# Patient Record
Sex: MALE | Race: WHITE | Employment: FULL TIME | URBAN - METROPOLITAN AREA
[De-identification: names, ages, dates, MRNs, and addresses within clinical notes are randomized per-mention and may not be internally consistent; named-entity substitution may affect disease eponyms.]

---

## 2017-03-21 ENCOUNTER — TELEPHONE (OUTPATIENT)
Dept: INTERNAL MEDICINE CLINIC | Facility: CLINIC | Age: 37
End: 2017-03-21

## 2017-03-21 NOTE — TELEPHONE ENCOUNTER
Patient is having severe, left-sided abdominal pain under his ribs, radiating towards and the lower part of abdomen. This has been occurring for a month now, but has been gradually getting worse and is severe now. Please triage.

## 2017-03-21 NOTE — TELEPHONE ENCOUNTER
Spoke to pt. States he woke up 1 month ago with pain on left side and under ribs. Pt has not gone away. Rates as 3/10. Describes as an achy pain. Can feel when he applies pressure. Pain has radiating to lower abdomen and back.   OV scheduled for 3/22/

## 2017-03-22 ENCOUNTER — OFFICE VISIT (OUTPATIENT)
Dept: INTERNAL MEDICINE CLINIC | Facility: CLINIC | Age: 37
End: 2017-03-22

## 2017-03-22 VITALS
DIASTOLIC BLOOD PRESSURE: 74 MMHG | OXYGEN SATURATION: 99 % | SYSTOLIC BLOOD PRESSURE: 126 MMHG | HEIGHT: 67 IN | TEMPERATURE: 99 F | BODY MASS INDEX: 31.08 KG/M2 | HEART RATE: 73 BPM | RESPIRATION RATE: 16 BRPM | WEIGHT: 198 LBS

## 2017-03-22 DIAGNOSIS — R10.9 ABDOMINAL PAIN, UNSPECIFIED LOCATION: Primary | ICD-10-CM

## 2017-03-22 DIAGNOSIS — R07.89 CHEST WALL TENDERNESS: ICD-10-CM

## 2017-03-22 DIAGNOSIS — Z72.89 ALCOHOL USE: ICD-10-CM

## 2017-03-22 DIAGNOSIS — Z00.00 LABORATORY EXAM ORDERED AS PART OF ROUTINE GENERAL MEDICAL EXAMINATION: ICD-10-CM

## 2017-03-22 PROCEDURE — 99214 OFFICE O/P EST MOD 30 MIN: CPT | Performed by: PHYSICIAN ASSISTANT

## 2017-03-22 RX ORDER — NAPROXEN 500 MG/1
500 TABLET ORAL 2 TIMES DAILY
Qty: 28 TABLET | Refills: 0 | Status: SHIPPED | OUTPATIENT
Start: 2017-03-22 | End: 2017-04-05

## 2017-03-22 NOTE — PROGRESS NOTES
Elan Sykes is a 43year old male  Patient presents with:  Pain: Left side pain      HPI:   Pt here today with concerns regarding pain over left lower ribs and wraps around to back x 1 month  - states pain is present all the time  - worse with sitting Wt 198 lb  BMI 31.00 kg/m2  SpO2 99%  GENERAL: well developed, well nourished,in no apparent distress  SKIN: no rashes,no suspicious lesions  HEENT: atraumatic, normocephalic,ears and throat are clear, no pallor or icterus  NECK: supple,no adenopathy, no b

## 2018-01-01 ENCOUNTER — APPOINTMENT (OUTPATIENT)
Dept: GENERAL RADIOLOGY | Facility: HOSPITAL | Age: 38
DRG: 003 | End: 2018-01-01
Attending: INTERNAL MEDICINE
Payer: COMMERCIAL

## 2018-01-01 ENCOUNTER — APPOINTMENT (OUTPATIENT)
Dept: ULTRASOUND IMAGING | Facility: HOSPITAL | Age: 38
DRG: 003 | End: 2018-01-01
Attending: SURGERY
Payer: COMMERCIAL

## 2018-01-01 ENCOUNTER — APPOINTMENT (OUTPATIENT)
Dept: CV DIAGNOSTICS | Facility: HOSPITAL | Age: 38
DRG: 003 | End: 2018-01-01
Attending: INTERNAL MEDICINE
Payer: COMMERCIAL

## 2018-01-01 ENCOUNTER — ANESTHESIA (OUTPATIENT)
Dept: INTERVENTIONAL RADIOLOGY/VASCULAR | Facility: HOSPITAL | Age: 38
End: 2018-01-01

## 2018-01-01 ENCOUNTER — APPOINTMENT (OUTPATIENT)
Dept: INTERVENTIONAL RADIOLOGY/VASCULAR | Facility: HOSPITAL | Age: 38
DRG: 003 | End: 2018-01-01
Attending: INTERNAL MEDICINE
Payer: COMMERCIAL

## 2018-01-01 ENCOUNTER — ANESTHESIA EVENT (OUTPATIENT)
Dept: CARDIAC SURGERY | Facility: HOSPITAL | Age: 38
DRG: 003 | End: 2018-01-01
Payer: COMMERCIAL

## 2018-01-01 ENCOUNTER — ANESTHESIA (OUTPATIENT)
Dept: CARDIAC SURGERY | Facility: HOSPITAL | Age: 38
DRG: 003 | End: 2018-01-01
Payer: COMMERCIAL

## 2018-01-01 ENCOUNTER — HOSPITAL ENCOUNTER (INPATIENT)
Facility: HOSPITAL | Age: 38
LOS: 3 days | DRG: 003 | End: 2018-01-01
Attending: EMERGENCY MEDICINE | Admitting: HOSPITALIST
Payer: COMMERCIAL

## 2018-01-01 ENCOUNTER — ANESTHESIA EVENT (OUTPATIENT)
Dept: INTERVENTIONAL RADIOLOGY/VASCULAR | Facility: HOSPITAL | Age: 38
End: 2018-01-01

## 2018-01-01 VITALS
DIASTOLIC BLOOD PRESSURE: 64 MMHG | TEMPERATURE: 98 F | WEIGHT: 231.06 LBS | HEIGHT: 67 IN | OXYGEN SATURATION: 90 % | BODY MASS INDEX: 36.27 KG/M2 | SYSTOLIC BLOOD PRESSURE: 102 MMHG

## 2018-01-01 DIAGNOSIS — I21.3 ST ELEVATION MYOCARDIAL INFARCTION (STEMI), UNSPECIFIED ARTERY (HCC): ICD-10-CM

## 2018-01-01 DIAGNOSIS — I46.9 CARDIAC ARREST (HCC): Primary | ICD-10-CM

## 2018-01-01 LAB
ALBUMIN SERPL-MCNC: 2.5 G/DL (ref 3.5–4.8)
ALBUMIN SERPL-MCNC: 2.8 G/DL (ref 3.5–4.8)
ALBUMIN SERPL-MCNC: 3 G/DL (ref 3.5–4.8)
ALBUMIN SERPL-MCNC: 3 G/DL (ref 3.5–4.8)
ALBUMIN SERPL-MCNC: 3.9 G/DL (ref 3.5–4.8)
ALBUMIN/GLOB SERPL: 1.1 {RATIO} (ref 1–2)
ALBUMIN/GLOB SERPL: 1.3 {RATIO} (ref 1–2)
ALBUMIN/GLOB SERPL: 1.6 {RATIO} (ref 1–2)
ALLENS TEST: POSITIVE
ALP LIVER SERPL-CCNC: 135 U/L (ref 45–117)
ALP LIVER SERPL-CCNC: 41 U/L (ref 45–117)
ALP LIVER SERPL-CCNC: 56 U/L (ref 45–117)
ALP LIVER SERPL-CCNC: 61 U/L (ref 45–117)
ALP LIVER SERPL-CCNC: 92 U/L (ref 45–117)
ALT SERPL-CCNC: 298 U/L (ref 17–63)
ALT SERPL-CCNC: 715 U/L (ref 17–63)
ALT SERPL-CCNC: 73 U/L (ref 17–63)
ALT SERPL-CCNC: 743 U/L (ref 17–63)
ALT SERPL-CCNC: 792 U/L (ref 17–63)
AMPHET UR QL SCN: NEGATIVE
ANION GAP SERPL CALC-SCNC: 10 MMOL/L (ref 0–18)
ANION GAP SERPL CALC-SCNC: 13 MMOL/L (ref 0–18)
ANION GAP SERPL CALC-SCNC: 14 MMOL/L (ref 0–18)
ANION GAP SERPL CALC-SCNC: 15 MMOL/L (ref 0–18)
ANION GAP SERPL CALC-SCNC: 23 MMOL/L (ref 0–18)
ANTIBODY SCREEN: NEGATIVE
APTT PPP: 111.3 SECONDS (ref 26.1–34.6)
APTT PPP: 187.9 SECONDS (ref 26.1–34.6)
APTT PPP: 220 SECONDS (ref 26.1–34.6)
APTT PPP: 220.1 SECONDS (ref 26.1–34.6)
APTT PPP: 23.9 SECONDS (ref 26.1–34.6)
APTT PPP: 242.9 SECONDS (ref 26.1–34.6)
APTT PPP: 245.9 SECONDS (ref 26.1–34.6)
APTT PPP: 260.4 SECONDS (ref 26.1–34.6)
APTT PPP: 272.3 SECONDS (ref 26.1–34.6)
ARTERIAL BLD GAS O2 SATURATION: 51 % (ref 92–100)
ARTERIAL BLD GAS O2 SATURATION: 51 % (ref 92–100)
ARTERIAL BLD GAS O2 SATURATION: 55 % (ref 92–100)
ARTERIAL BLD GAS O2 SATURATION: 61 % (ref 92–100)
ARTERIAL BLD GAS O2 SATURATION: 62 % (ref 92–100)
ARTERIAL BLD GAS O2 SATURATION: 91 % (ref 92–100)
ARTERIAL BLD GAS O2 SATURATION: 96 % (ref 92–100)
ARTERIAL BLD GAS O2 SATURATION: 97 % (ref 92–100)
ARTERIAL BLD GAS O2 SATURATION: 98 % (ref 92–100)
ARTERIAL BLOOD GAS BASE EXCESS: -10.9
ARTERIAL BLOOD GAS BASE EXCESS: -15.1
ARTERIAL BLOOD GAS BASE EXCESS: -15.7
ARTERIAL BLOOD GAS BASE EXCESS: -16.4
ARTERIAL BLOOD GAS BASE EXCESS: -17.2
ARTERIAL BLOOD GAS BASE EXCESS: -18.2
ARTERIAL BLOOD GAS BASE EXCESS: -2.8
ARTERIAL BLOOD GAS BASE EXCESS: -4.4
ARTERIAL BLOOD GAS BASE EXCESS: -6.3
ARTERIAL BLOOD GAS BASE EXCESS: -6.9
ARTERIAL BLOOD GAS BASE EXCESS: -7.1
ARTERIAL BLOOD GAS BASE EXCESS: -8.2
ARTERIAL BLOOD GAS BASE EXCESS: -9.1
ARTERIAL BLOOD GAS HCO3: 12.7 MEQ/L (ref 22–26)
ARTERIAL BLOOD GAS HCO3: 13.3 MEQ/L (ref 22–26)
ARTERIAL BLOOD GAS HCO3: 14.2 MEQ/L (ref 22–26)
ARTERIAL BLOOD GAS HCO3: 15.3 MEQ/L (ref 22–26)
ARTERIAL BLOOD GAS HCO3: 15.6 MEQ/L (ref 22–26)
ARTERIAL BLOOD GAS HCO3: 18.5 MEQ/L (ref 22–26)
ARTERIAL BLOOD GAS HCO3: 19.1 MEQ/L (ref 22–26)
ARTERIAL BLOOD GAS HCO3: 19.4 MEQ/L (ref 22–26)
ARTERIAL BLOOD GAS HCO3: 19.9 MEQ/L (ref 22–26)
ARTERIAL BLOOD GAS HCO3: 20.2 MEQ/L (ref 22–26)
ARTERIAL BLOOD GAS HCO3: 20.8 MEQ/L (ref 22–26)
ARTERIAL BLOOD GAS HCO3: 21 MEQ/L (ref 22–26)
ARTERIAL BLOOD GAS HCO3: 23.1 MEQ/L (ref 22–26)
ARTERIAL BLOOD GAS PCO2: 35 MM HG (ref 35–45)
ARTERIAL BLOOD GAS PCO2: 37 MM HG (ref 35–45)
ARTERIAL BLOOD GAS PCO2: 42 MM HG (ref 35–45)
ARTERIAL BLOOD GAS PCO2: 43 MM HG (ref 35–45)
ARTERIAL BLOOD GAS PCO2: 43 MM HG (ref 35–45)
ARTERIAL BLOOD GAS PCO2: 44 MM HG (ref 35–45)
ARTERIAL BLOOD GAS PCO2: 44 MM HG (ref 35–45)
ARTERIAL BLOOD GAS PCO2: 45 MM HG (ref 35–45)
ARTERIAL BLOOD GAS PCO2: 51 MM HG (ref 35–45)
ARTERIAL BLOOD GAS PCO2: 53 MM HG (ref 35–45)
ARTERIAL BLOOD GAS PCO2: 54 MM HG (ref 35–45)
ARTERIAL BLOOD GAS PCO2: 61 MM HG (ref 35–45)
ARTERIAL BLOOD GAS PCO2: 63 MM HG (ref 35–45)
ARTERIAL BLOOD GAS PH: 7.02 (ref 7.35–7.45)
ARTERIAL BLOOD GAS PH: 7.02 (ref 7.35–7.45)
ARTERIAL BLOOD GAS PH: 7.03 (ref 7.35–7.45)
ARTERIAL BLOOD GAS PH: 7.06 (ref 7.35–7.45)
ARTERIAL BLOOD GAS PH: 7.11 (ref 7.35–7.45)
ARTERIAL BLOOD GAS PH: 7.18 (ref 7.35–7.45)
ARTERIAL BLOOD GAS PH: 7.24 (ref 7.35–7.45)
ARTERIAL BLOOD GAS PH: 7.25 (ref 7.35–7.45)
ARTERIAL BLOOD GAS PH: 7.26 (ref 7.35–7.45)
ARTERIAL BLOOD GAS PH: 7.27 (ref 7.35–7.45)
ARTERIAL BLOOD GAS PH: 7.28 (ref 7.35–7.45)
ARTERIAL BLOOD GAS PH: 7.35 (ref 7.35–7.45)
ARTERIAL BLOOD GAS PH: 7.36 (ref 7.35–7.45)
ARTERIAL BLOOD GAS PO2: 140 MM HG (ref 80–105)
ARTERIAL BLOOD GAS PO2: 168 MM HG (ref 80–105)
ARTERIAL BLOOD GAS PO2: 37 MM HG (ref 80–105)
ARTERIAL BLOOD GAS PO2: 38 MM HG (ref 80–105)
ARTERIAL BLOOD GAS PO2: 389 MM HG (ref 80–105)
ARTERIAL BLOOD GAS PO2: 393 MM HG (ref 80–105)
ARTERIAL BLOOD GAS PO2: 40 MM HG (ref 80–105)
ARTERIAL BLOOD GAS PO2: 402 MM HG (ref 80–105)
ARTERIAL BLOOD GAS PO2: 402 MM HG (ref 80–105)
ARTERIAL BLOOD GAS PO2: 415 MM HG (ref 80–105)
ARTERIAL BLOOD GAS PO2: 42 MM HG (ref 80–105)
ARTERIAL BLOOD GAS PO2: 46 MM HG (ref 80–105)
ARTERIAL BLOOD GAS PO2: 67 MM HG (ref 80–105)
AST SERPL-CCNC: 1809 U/L (ref 15–41)
AST SERPL-CCNC: 2330 U/L (ref 15–41)
AST SERPL-CCNC: 2653 U/L (ref 15–41)
AST SERPL-CCNC: 55 U/L (ref 15–41)
AST SERPL-CCNC: 619 U/L (ref 15–41)
ATRIAL RATE: 129 BPM
ATRIAL RATE: 129 BPM
ATRIAL RATE: 86 BPM
BAND %: 19 %
BARBITURATES UR QL SCN: NEGATIVE
BASOPHIL % MANUAL: 0 %
BASOPHIL ABSOLUTE MANUAL: 0 X10(3) UL (ref 0–0.1)
BASOPHILS # BLD AUTO: 0.01 X10(3) UL (ref 0–0.1)
BASOPHILS # BLD AUTO: 0.01 X10(3) UL (ref 0–0.1)
BASOPHILS # BLD AUTO: 0.02 X10(3) UL (ref 0–0.1)
BASOPHILS # BLD AUTO: 0.02 X10(3) UL (ref 0–0.1)
BASOPHILS # BLD AUTO: 0.05 X10(3) UL (ref 0–0.1)
BASOPHILS # BLD AUTO: 0.08 X10(3) UL (ref 0–0.1)
BASOPHILS NFR BLD AUTO: 0.1 %
BASOPHILS NFR BLD AUTO: 0.3 %
BASOPHILS NFR BLD AUTO: 0.5 %
BENZODIAZ UR QL SCN: NEGATIVE
BILIRUB DIRECT SERPL-MCNC: 0.3 MG/DL (ref 0.1–0.5)
BILIRUB DIRECT SERPL-MCNC: <0.1 MG/DL (ref 0.1–0.5)
BILIRUB SERPL-MCNC: 0.7 MG/DL (ref 0.1–2)
BILIRUB SERPL-MCNC: 0.8 MG/DL (ref 0.1–2)
BILIRUB SERPL-MCNC: 1.1 MG/DL (ref 0.1–2)
BILIRUB SERPL-MCNC: 1.4 MG/DL (ref 0.1–2)
BILIRUB SERPL-MCNC: 1.6 MG/DL (ref 0.1–2)
BLOOD TYPE BARCODE: 5100
BLOOD TYPE BARCODE: 6200
BUN BLD-MCNC: 14 MG/DL (ref 8–20)
BUN BLD-MCNC: 18 MG/DL (ref 8–20)
BUN BLD-MCNC: 18 MG/DL (ref 8–20)
BUN BLD-MCNC: 19 MG/DL (ref 8–20)
BUN BLD-MCNC: 20 MG/DL (ref 8–20)
BUN BLD-MCNC: 21 MG/DL (ref 8–20)
BUN BLD-MCNC: 22 MG/DL (ref 8–20)
BUN BLD-MCNC: 23 MG/DL (ref 8–20)
BUN BLD-MCNC: 23 MG/DL (ref 8–20)
BUN BLD-MCNC: 24 MG/DL (ref 8–20)
BUN BLD-MCNC: 25 MG/DL (ref 8–20)
BUN/CREAT SERPL: 10.6 (ref 10–20)
BUN/CREAT SERPL: 11.2 (ref 10–20)
BUN/CREAT SERPL: 7.9 (ref 10–20)
BUN/CREAT SERPL: 8.3 (ref 10–20)
BUN/CREAT SERPL: 8.5 (ref 10–20)
CALCIUM BLD-MCNC: 5.6 MG/DL (ref 8.3–10.3)
CALCIUM BLD-MCNC: 5.9 MG/DL (ref 8.3–10.3)
CALCIUM BLD-MCNC: 6.3 MG/DL (ref 8.3–10.3)
CALCIUM BLD-MCNC: 6.4 MG/DL (ref 8.3–10.3)
CALCIUM BLD-MCNC: 6.4 MG/DL (ref 8.3–10.3)
CALCIUM BLD-MCNC: 6.5 MG/DL (ref 8.3–10.3)
CALCIUM BLD-MCNC: 6.7 MG/DL (ref 8.3–10.3)
CALCIUM BLD-MCNC: 6.7 MG/DL (ref 8.3–10.3)
CALCIUM BLD-MCNC: 6.8 MG/DL (ref 8.3–10.3)
CALCIUM BLD-MCNC: 7.4 MG/DL (ref 8.3–10.3)
CALCIUM BLD-MCNC: 8.4 MG/DL (ref 8.3–10.3)
CALCULATED O2 SATURATION: 100 % (ref 92–100)
CALCULATED O2 SATURATION: 47 % (ref 92–100)
CALCULATED O2 SATURATION: 47 % (ref 92–100)
CALCULATED O2 SATURATION: 50 % (ref 92–100)
CALCULATED O2 SATURATION: 56 % (ref 92–100)
CALCULATED O2 SATURATION: 57 % (ref 92–100)
CALCULATED O2 SATURATION: 93 % (ref 92–100)
CALCULATED O2 SATURATION: 98 % (ref 92–100)
CALCULATED O2 SATURATION: 99 % (ref 92–100)
CANNABINOIDS UR QL SCN: NEGATIVE
CARBOXYHEMOGLOBIN: 1 % SAT (ref 0–3)
CARBOXYHEMOGLOBIN: 1.2 % SAT (ref 0–3)
CARBOXYHEMOGLOBIN: 1.3 % SAT (ref 0–3)
CARBOXYHEMOGLOBIN: 1.4 % SAT (ref 0–3)
CARBOXYHEMOGLOBIN: 1.4 % SAT (ref 0–3)
CARBOXYHEMOGLOBIN: 1.5 % SAT (ref 0–3)
CARBOXYHEMOGLOBIN: 1.5 % SAT (ref 0–3)
CARBOXYHEMOGLOBIN: 1.6 % SAT (ref 0–3)
CARBOXYHEMOGLOBIN: 1.9 % SAT (ref 0–3)
CHLORIDE SERPL-SCNC: 103 MMOL/L (ref 101–111)
CHLORIDE SERPL-SCNC: 104 MMOL/L (ref 101–111)
CHLORIDE SERPL-SCNC: 107 MMOL/L (ref 101–111)
CHLORIDE SERPL-SCNC: 108 MMOL/L (ref 101–111)
CHLORIDE SERPL-SCNC: 109 MMOL/L (ref 101–111)
CHLORIDE SERPL-SCNC: 110 MMOL/L (ref 101–111)
CHLORIDE SERPL-SCNC: 111 MMOL/L (ref 101–111)
CHLORIDE SERPL-SCNC: 111 MMOL/L (ref 101–111)
CK SERPL-CCNC: 4848 IU/L (ref 39–308)
CK SERPL-CCNC: 7608 IU/L (ref 39–308)
CO2 SERPL-SCNC: 13 MMOL/L (ref 22–32)
CO2 SERPL-SCNC: 19 MMOL/L (ref 22–32)
CO2 SERPL-SCNC: 19 MMOL/L (ref 22–32)
CO2 SERPL-SCNC: 20 MMOL/L (ref 22–32)
CO2 SERPL-SCNC: 21 MMOL/L (ref 22–32)
CO2 SERPL-SCNC: 22 MMOL/L (ref 22–32)
CO2 SERPL-SCNC: 23 MMOL/L (ref 22–32)
CO2 SERPL-SCNC: 24 MMOL/L (ref 22–32)
CO2 SERPL-SCNC: 24 MMOL/L (ref 22–32)
COCAINE UR QL: NEGATIVE
CREAT BLD-MCNC: 1.25 MG/DL (ref 0.7–1.3)
CREAT BLD-MCNC: 1.8 MG/DL (ref 0.7–1.3)
CREAT BLD-MCNC: 2.14 MG/DL (ref 0.7–1.3)
CREAT BLD-MCNC: 2.15 MG/DL (ref 0.7–1.3)
CREAT BLD-MCNC: 2.25 MG/DL (ref 0.7–1.3)
CREAT BLD-MCNC: 2.34 MG/DL (ref 0.7–1.3)
CREAT BLD-MCNC: 2.34 MG/DL (ref 0.7–1.3)
CREAT BLD-MCNC: 2.39 MG/DL (ref 0.7–1.3)
CREAT BLD-MCNC: 2.41 MG/DL (ref 0.7–1.3)
CREAT BLD-MCNC: 2.56 MG/DL (ref 0.7–1.3)
CREAT BLD-MCNC: 2.59 MG/DL (ref 0.7–1.3)
CREAT BLD-MCNC: 2.6 MG/DL (ref 0.7–1.3)
CREAT BLD-MCNC: 2.6 MG/DL (ref 0.7–1.3)
CREAT BLD-MCNC: 2.69 MG/DL (ref 0.7–1.3)
CREAT BLD-MCNC: 2.77 MG/DL (ref 0.7–1.3)
CREAT BLD-MCNC: 2.78 MG/DL (ref 0.7–1.3)
CREAT BLD-MCNC: 2.78 MG/DL (ref 0.7–1.3)
CREAT BLD-MCNC: 2.88 MG/DL (ref 0.7–1.3)
D-DIMER: 0.28 UG/ML FEU (ref 0–0.49)
D-DIMER: 5.31 UG/ML FEU (ref 0–0.49)
EOSINOPHIL # BLD AUTO: 0.01 X10(3) UL (ref 0–0.3)
EOSINOPHIL # BLD AUTO: 0.01 X10(3) UL (ref 0–0.3)
EOSINOPHIL # BLD AUTO: 0.02 X10(3) UL (ref 0–0.3)
EOSINOPHIL # BLD AUTO: 0.04 X10(3) UL (ref 0–0.3)
EOSINOPHIL # BLD AUTO: 0.06 X10(3) UL (ref 0–0.3)
EOSINOPHIL # BLD AUTO: 0.14 X10(3) UL (ref 0–0.3)
EOSINOPHIL % MANUAL: 1 %
EOSINOPHIL ABSOLUTE MANUAL: 0.13 X10(3) UL (ref 0–0.3)
EOSINOPHIL NFR BLD AUTO: 0.1 %
EOSINOPHIL NFR BLD AUTO: 0.2 %
EOSINOPHIL NFR BLD AUTO: 0.5 %
EOSINOPHIL NFR BLD AUTO: 1.3 %
ERYTHROCYTE [DISTWIDTH] IN BLOOD BY AUTOMATED COUNT: 12 % (ref 11.5–16)
ERYTHROCYTE [DISTWIDTH] IN BLOOD BY AUTOMATED COUNT: 12.4 % (ref 11.5–16)
ERYTHROCYTE [DISTWIDTH] IN BLOOD BY AUTOMATED COUNT: 13 % (ref 11.5–16)
ERYTHROCYTE [DISTWIDTH] IN BLOOD BY AUTOMATED COUNT: 13.6 % (ref 11.5–16)
ERYTHROCYTE [DISTWIDTH] IN BLOOD BY AUTOMATED COUNT: 13.7 % (ref 11.5–16)
ERYTHROCYTE [DISTWIDTH] IN BLOOD BY AUTOMATED COUNT: 13.8 % (ref 11.5–16)
ERYTHROCYTE [DISTWIDTH] IN BLOOD BY AUTOMATED COUNT: 14.3 % (ref 11.5–16)
ERYTHROCYTE [DISTWIDTH] IN BLOOD BY AUTOMATED COUNT: 14.6 % (ref 11.5–16)
ERYTHROCYTE [DISTWIDTH] IN BLOOD BY AUTOMATED COUNT: 14.6 % (ref 11.5–16)
ERYTHROCYTE [DISTWIDTH] IN BLOOD BY AUTOMATED COUNT: 14.8 % (ref 11.5–16)
EST. AVERAGE GLUCOSE BLD GHB EST-MCNC: 114 MG/DL (ref 68–126)
ETHANOL UR-MCNC: POSITIVE MG/DL
ETHYL ALCOHOL: 30 MG/DL (ref ?–3)
FIBRINOGEN: 199 MG/DL (ref 200–446)
FIBRINOGEN: 425 MG/DL (ref 200–446)
FIO2: 100 %
FIO2: 40 %
FIO2: 40 %
FIO2: 60 %
FIO2: 60 %
FIO2: 80 %
GLOBULIN PLAS-MCNC: 1.6 G/DL (ref 2.5–4)
GLOBULIN PLAS-MCNC: 2.3 G/DL (ref 2.5–4)
GLOBULIN PLAS-MCNC: 3.7 G/DL (ref 2.5–4)
GLUCOSE BLD-MCNC: 100 MG/DL (ref 65–99)
GLUCOSE BLD-MCNC: 100 MG/DL (ref 65–99)
GLUCOSE BLD-MCNC: 100 MG/DL (ref 70–99)
GLUCOSE BLD-MCNC: 101 MG/DL (ref 65–99)
GLUCOSE BLD-MCNC: 101 MG/DL (ref 65–99)
GLUCOSE BLD-MCNC: 102 MG/DL (ref 65–99)
GLUCOSE BLD-MCNC: 102 MG/DL (ref 70–99)
GLUCOSE BLD-MCNC: 103 MG/DL (ref 70–99)
GLUCOSE BLD-MCNC: 104 MG/DL (ref 65–99)
GLUCOSE BLD-MCNC: 104 MG/DL (ref 65–99)
GLUCOSE BLD-MCNC: 106 MG/DL (ref 65–99)
GLUCOSE BLD-MCNC: 106 MG/DL (ref 70–99)
GLUCOSE BLD-MCNC: 108 MG/DL (ref 65–99)
GLUCOSE BLD-MCNC: 108 MG/DL (ref 65–99)
GLUCOSE BLD-MCNC: 109 MG/DL (ref 65–99)
GLUCOSE BLD-MCNC: 109 MG/DL (ref 70–99)
GLUCOSE BLD-MCNC: 110 MG/DL (ref 65–99)
GLUCOSE BLD-MCNC: 110 MG/DL (ref 70–99)
GLUCOSE BLD-MCNC: 110 MG/DL (ref 70–99)
GLUCOSE BLD-MCNC: 111 MG/DL (ref 65–99)
GLUCOSE BLD-MCNC: 112 MG/DL (ref 65–99)
GLUCOSE BLD-MCNC: 113 MG/DL (ref 65–99)
GLUCOSE BLD-MCNC: 113 MG/DL (ref 65–99)
GLUCOSE BLD-MCNC: 114 MG/DL (ref 65–99)
GLUCOSE BLD-MCNC: 114 MG/DL (ref 70–99)
GLUCOSE BLD-MCNC: 115 MG/DL (ref 65–99)
GLUCOSE BLD-MCNC: 116 MG/DL (ref 65–99)
GLUCOSE BLD-MCNC: 117 MG/DL (ref 65–99)
GLUCOSE BLD-MCNC: 118 MG/DL (ref 65–99)
GLUCOSE BLD-MCNC: 118 MG/DL (ref 65–99)
GLUCOSE BLD-MCNC: 118 MG/DL (ref 70–99)
GLUCOSE BLD-MCNC: 119 MG/DL (ref 65–99)
GLUCOSE BLD-MCNC: 120 MG/DL (ref 70–99)
GLUCOSE BLD-MCNC: 122 MG/DL (ref 65–99)
GLUCOSE BLD-MCNC: 123 MG/DL (ref 65–99)
GLUCOSE BLD-MCNC: 123 MG/DL (ref 65–99)
GLUCOSE BLD-MCNC: 123 MG/DL (ref 70–99)
GLUCOSE BLD-MCNC: 124 MG/DL (ref 65–99)
GLUCOSE BLD-MCNC: 124 MG/DL (ref 70–99)
GLUCOSE BLD-MCNC: 125 MG/DL (ref 65–99)
GLUCOSE BLD-MCNC: 126 MG/DL (ref 65–99)
GLUCOSE BLD-MCNC: 130 MG/DL (ref 70–99)
GLUCOSE BLD-MCNC: 137 MG/DL (ref 65–99)
GLUCOSE BLD-MCNC: 137 MG/DL (ref 70–99)
GLUCOSE BLD-MCNC: 145 MG/DL (ref 65–99)
GLUCOSE BLD-MCNC: 149 MG/DL (ref 70–99)
GLUCOSE BLD-MCNC: 149 MG/DL (ref 70–99)
GLUCOSE BLD-MCNC: 164 MG/DL (ref 65–99)
GLUCOSE BLD-MCNC: 167 MG/DL (ref 65–99)
GLUCOSE BLD-MCNC: 173 MG/DL (ref 65–99)
GLUCOSE BLD-MCNC: 177 MG/DL (ref 65–99)
GLUCOSE BLD-MCNC: 195 MG/DL (ref 70–99)
GLUCOSE BLD-MCNC: 227 MG/DL (ref 65–99)
GLUCOSE BLD-MCNC: 235 MG/DL (ref 65–99)
GLUCOSE BLD-MCNC: 243 MG/DL (ref 70–99)
GLUCOSE BLD-MCNC: 256 MG/DL (ref 65–99)
GLUCOSE BLD-MCNC: 322 MG/DL (ref 70–99)
GLUCOSE BLD-MCNC: 323 MG/DL (ref 65–99)
GLUCOSE BLD-MCNC: 338 MG/DL (ref 65–99)
GLUCOSE BLD-MCNC: 355 MG/DL (ref 70–99)
GLUCOSE BLD-MCNC: 379 MG/DL (ref 70–99)
GLUCOSE BLD-MCNC: 87 MG/DL (ref 65–99)
GLUCOSE BLD-MCNC: 88 MG/DL (ref 70–99)
GLUCOSE BLD-MCNC: 89 MG/DL (ref 65–99)
GLUCOSE BLD-MCNC: 92 MG/DL (ref 65–99)
GLUCOSE BLD-MCNC: 94 MG/DL (ref 65–99)
GLUCOSE BLD-MCNC: 99 MG/DL (ref 70–99)
HAV IGM SER QL: 1.7 MG/DL (ref 1.8–2.5)
HAV IGM SER QL: 1.9 MG/DL (ref 1.8–2.5)
HAV IGM SER QL: 2 MG/DL (ref 1.8–2.5)
HAV IGM SER QL: 2.1 MG/DL (ref 1.8–2.5)
HAV IGM SER QL: 2.5 MG/DL (ref 1.8–2.5)
HBA1C MFR BLD HPLC: 5.6 % (ref ?–5.7)
HBV SURFACE AG SER-ACNC: 0.14 [IU]/L
HBV SURFACE AG SERPL QL IA: NONREACTIVE
HCT VFR BLD AUTO: 19.1 % (ref 37–53)
HCT VFR BLD AUTO: 19.3 % (ref 37–53)
HCT VFR BLD AUTO: 19.4 % (ref 37–53)
HCT VFR BLD AUTO: 19.8 % (ref 37–53)
HCT VFR BLD AUTO: 20.7 % (ref 37–53)
HCT VFR BLD AUTO: 23.2 % (ref 37–53)
HCT VFR BLD AUTO: 26.1 % (ref 37–53)
HCT VFR BLD AUTO: 28.5 % (ref 37–53)
HCT VFR BLD AUTO: 46.3 % (ref 37–53)
HCT VFR BLD AUTO: 46.7 % (ref 37–53)
HEPARIN INDUCED PLT ANTIBODY: NEGATIVE
HGB BLD-MCNC: 10.9 G/DL (ref 13–17)
HGB BLD-MCNC: 15.7 G/DL (ref 13–17)
HGB BLD-MCNC: 15.8 G/DL (ref 13–17)
HGB BLD-MCNC: 6.6 G/DL (ref 13–17)
HGB BLD-MCNC: 6.9 G/DL (ref 13–17)
HGB BLD-MCNC: 7.1 G/DL (ref 13–17)
HGB BLD-MCNC: 7.5 G/DL (ref 13–17)
HGB BLD-MCNC: 8.2 G/DL (ref 13–17)
HGB BLD-MCNC: 9 G/DL (ref 13–17)
HGB BLD-MCNC: 9.9 G/DL (ref 13–17)
IMMATURE GRANULOCYTE COUNT: 0.03 X10(3) UL (ref 0–1)
IMMATURE GRANULOCYTE COUNT: 0.08 X10(3) UL (ref 0–1)
IMMATURE GRANULOCYTE COUNT: 0.13 X10(3) UL (ref 0–1)
IMMATURE GRANULOCYTE COUNT: 0.24 X10(3) UL (ref 0–1)
IMMATURE GRANULOCYTE COUNT: 0.26 X10(3) UL (ref 0–1)
IMMATURE GRANULOCYTE COUNT: 0.48 X10(3) UL (ref 0–1)
IMMATURE GRANULOCYTE RATIO %: 0.3 %
IMMATURE GRANULOCYTE RATIO %: 0.6 %
IMMATURE GRANULOCYTE RATIO %: 0.7 %
IMMATURE GRANULOCYTE RATIO %: 1.6 %
IMMATURE GRANULOCYTE RATIO %: 1.8 %
IMMATURE GRANULOCYTE RATIO %: 2.1 %
INR BLD: 0.96 (ref 0.9–1.1)
INR BLD: 1.29 (ref 0.9–1.1)
INR BLD: 1.36 (ref 0.9–1.1)
INR BLD: 1.72 (ref 0.9–1.1)
INR BLD: 2.01 (ref 0.9–1.1)
IONIZED CALCIUM: 0.99 MMOL/L (ref 1.12–1.32)
IONIZED CALCIUM: 0.99 MMOL/L (ref 1.12–1.32)
IONIZED CALCIUM: 1.02 MMOL/L (ref 1.12–1.32)
IONIZED CALCIUM: 1.02 MMOL/L (ref 1.12–1.32)
IONIZED CALCIUM: 1.06 MMOL/L (ref 1.12–1.32)
IONIZED CALCIUM: 1.06 MMOL/L (ref 1.12–1.32)
ISTAT ACTIVATED CLOTTING TIME: 125 SECONDS (ref 74–137)
ISTAT ACTIVATED CLOTTING TIME: 142 SECONDS (ref 74–137)
ISTAT ACTIVATED CLOTTING TIME: 153 SECONDS (ref 74–137)
ISTAT ACTIVATED CLOTTING TIME: 158 SECONDS (ref 74–137)
ISTAT ACTIVATED CLOTTING TIME: 164 SECONDS (ref 74–137)
ISTAT ACTIVATED CLOTTING TIME: 164 SECONDS (ref 74–137)
ISTAT ACTIVATED CLOTTING TIME: 175 SECONDS (ref 74–137)
ISTAT ACTIVATED CLOTTING TIME: 180 SECONDS (ref 74–137)
ISTAT ACTIVATED CLOTTING TIME: 180 SECONDS (ref 74–137)
ISTAT ACTIVATED CLOTTING TIME: 186 SECONDS (ref 74–137)
ISTAT ACTIVATED CLOTTING TIME: 191 SECONDS (ref 74–137)
ISTAT ACTIVATED CLOTTING TIME: 197 SECONDS (ref 74–137)
ISTAT ACTIVATED CLOTTING TIME: 202 SECONDS (ref 74–137)
ISTAT ACTIVATED CLOTTING TIME: 208 SECONDS (ref 74–137)
ISTAT ACTIVATED CLOTTING TIME: 213 SECONDS (ref 74–137)
ISTAT ACTIVATED CLOTTING TIME: 230 SECONDS (ref 74–137)
ISTAT ACTIVATED CLOTTING TIME: 235 SECONDS (ref 74–137)
ISTAT ACTIVATED CLOTTING TIME: 246 SECONDS (ref 74–137)
ISTAT ACTIVATED CLOTTING TIME: 285 SECONDS (ref 74–137)
ISTAT BLOOD GAS BASE DEFICIT: -1 MMOL/L
ISTAT BLOOD GAS BASE DEFICIT: -2 MMOL/L
ISTAT BLOOD GAS BASE DEFICIT: -3 MMOL/L
ISTAT BLOOD GAS BASE DEFICIT: -5 MMOL/L
ISTAT BLOOD GAS BASE DEFICIT: -5 MMOL/L
ISTAT BLOOD GAS BASE DEFICIT: -7 MMOL/L
ISTAT BLOOD GAS HCO3: 20.4 MEQ/L (ref 22–26)
ISTAT BLOOD GAS HCO3: 20.6 MEQ/L (ref 22–26)
ISTAT BLOOD GAS HCO3: 20.8 MEQ/L (ref 22–26)
ISTAT BLOOD GAS HCO3: 22.3 MEQ/L (ref 22–26)
ISTAT BLOOD GAS HCO3: 22.5 MEQ/L (ref 22–26)
ISTAT BLOOD GAS HCO3: 25.9 MEQ/L (ref 22–26)
ISTAT BLOOD GAS O2 SATURATION: 100 % (ref 92–100)
ISTAT BLOOD GAS O2 SATURATION: 70 % (ref 92–100)
ISTAT BLOOD GAS O2 SATURATION: 93 % (ref 92–100)
ISTAT BLOOD GAS PCO2: 35 MMHG (ref 35–45)
ISTAT BLOOD GAS PCO2: 37 MMHG (ref 35–45)
ISTAT BLOOD GAS PCO2: 37 MMHG (ref 35–45)
ISTAT BLOOD GAS PCO2: 39 MMHG (ref 35–45)
ISTAT BLOOD GAS PCO2: 53 MMHG (ref 35–45)
ISTAT BLOOD GAS PCO2: 54 MMHG (ref 35–45)
ISTAT BLOOD GAS PH: 7.2 (ref 7.35–7.45)
ISTAT BLOOD GAS PH: 7.28 (ref 7.35–7.45)
ISTAT BLOOD GAS PH: 7.35 (ref 7.35–7.45)
ISTAT BLOOD GAS PH: 7.35 (ref 7.35–7.45)
ISTAT BLOOD GAS PH: 7.37 (ref 7.35–7.45)
ISTAT BLOOD GAS PH: 7.42 (ref 7.35–7.45)
ISTAT BLOOD GAS PO2: 292 MMHG (ref 80–105)
ISTAT BLOOD GAS PO2: 368 MMHG (ref 80–105)
ISTAT BLOOD GAS PO2: 387 MMHG (ref 80–105)
ISTAT BLOOD GAS PO2: <70 MMHG (ref 80–105)
ISTAT BLOOD GAS PO2: <70 MMHG (ref 80–105)
ISTAT BLOOD GAS PO2: >430 MMHG (ref 80–105)
ISTAT BLOOD GAS TCO2: 22 MMOL/L (ref 22–32)
ISTAT BLOOD GAS TCO2: 24 MMOL/L (ref 22–32)
ISTAT BLOOD GAS TCO2: 24 MMOL/L (ref 22–32)
ISTAT BLOOD GAS TCO2: 28 MMOL/L (ref 22–32)
ISTAT HEMATOCRIT: 16 % (ref 37–53)
ISTAT HEMATOCRIT: 17 % (ref 37–53)
ISTAT HEMATOCRIT: 18 % (ref 37–53)
ISTAT HEMATOCRIT: 27 % (ref 37–53)
ISTAT HEMATOCRIT: 29 % (ref 37–53)
ISTAT HEMATOCRIT: 35 % (ref 37–53)
ISTAT IONIZED CALCIUM: 0.7 MMOL/L (ref 1.12–1.32)
ISTAT IONIZED CALCIUM: 0.73 MMOL/L (ref 1.12–1.32)
ISTAT IONIZED CALCIUM: 0.9 MMOL/L (ref 1.12–1.32)
ISTAT IONIZED CALCIUM: 0.91 MMOL/L (ref 1.12–1.32)
ISTAT IONIZED CALCIUM: 0.91 MMOL/L (ref 1.12–1.32)
ISTAT IONIZED CALCIUM: 1.04 MMOL/L (ref 1.12–1.32)
ISTAT PATIENT TEMPERATURE: 34 DEGREE
ISTAT POTASSIUM: 2.3 MMOL/L (ref 3.6–5.1)
ISTAT POTASSIUM: 2.9 MMOL/L (ref 3.6–5.1)
ISTAT POTASSIUM: 5.3 MMOL/L (ref 3.6–5.1)
ISTAT POTASSIUM: 5.3 MMOL/L (ref 3.6–5.1)
ISTAT POTASSIUM: 5.7 MMOL/L (ref 3.6–5.1)
ISTAT POTASSIUM: <2.3 MMOL/L (ref 3.6–5.1)
ISTAT SODIUM: 135 MMOL/L (ref 136–144)
ISTAT SODIUM: 135 MMOL/L (ref 136–144)
ISTAT SODIUM: 136 MMOL/L (ref 136–144)
ISTAT SODIUM: 144 MMOL/L (ref 136–144)
ISTAT SODIUM: 145 MMOL/L (ref 136–144)
ISTAT SODIUM: 148 MMOL/L (ref 136–144)
L/M: 10 L/MIN
L/M: 15 L/MIN
LACTIC ACID ARTERIAL: 14 MMOL/L (ref 0.5–2)
LACTIC ACID ARTERIAL: 15.6 MMOL/L (ref 0.5–2)
LACTIC ACID ARTERIAL: 3.6 MMOL/L (ref 0.5–2)
LACTIC ACID ARTERIAL: 3.9 MMOL/L (ref 0.5–2)
LACTIC ACID ARTERIAL: 9 MMOL/L (ref 0.5–2)
LACTIC ACID ARTERIAL: >17.5 MMOL/L (ref 0.5–2)
LACTIC ACID: 10 MMOL/L (ref 0.5–2)
LACTIC ACID: 12.6 MMOL/L (ref 0.5–2)
LACTIC ACID: 9 MMOL/L (ref 0.5–2)
LARGE PLATELETS: PRESENT
LARGE PLATELETS: PRESENT
LDH: 904 U/L (ref 84–249)
LYMPHOCYTE % MANUAL: 13 %
LYMPHOCYTE ABSOLUTE MANUAL: 1.74 X10(3) UL (ref 0.9–4)
LYMPHOCYTES # BLD AUTO: 1.67 X10(3) UL (ref 0.9–4)
LYMPHOCYTES # BLD AUTO: 1.94 X10(3) UL (ref 0.9–4)
LYMPHOCYTES # BLD AUTO: 1.95 X10(3) UL (ref 0.9–4)
LYMPHOCYTES # BLD AUTO: 2.29 X10(3) UL (ref 0.9–4)
LYMPHOCYTES # BLD AUTO: 2.74 X10(3) UL (ref 0.9–4)
LYMPHOCYTES # BLD AUTO: 4.16 X10(3) UL (ref 0.9–4)
LYMPHOCYTES NFR BLD AUTO: 10.2 %
LYMPHOCYTES NFR BLD AUTO: 10.9 %
LYMPHOCYTES NFR BLD AUTO: 14 %
LYMPHOCYTES NFR BLD AUTO: 14.2 %
LYMPHOCYTES NFR BLD AUTO: 14.7 %
LYMPHOCYTES NFR BLD AUTO: 40 %
M PROTEIN MFR SERPL ELPH: 4.1 G/DL (ref 6.1–8.3)
M PROTEIN MFR SERPL ELPH: 5.2 G/DL (ref 6.1–8.3)
M PROTEIN MFR SERPL ELPH: 5.3 G/DL (ref 6.1–8.3)
M PROTEIN MFR SERPL ELPH: 6 G/DL (ref 6.1–8.3)
M PROTEIN MFR SERPL ELPH: 7.6 G/DL (ref 6.1–8.3)
MCH RBC QN AUTO: 30.4 PG (ref 27–33.2)
MCH RBC QN AUTO: 30.6 PG (ref 27–33.2)
MCH RBC QN AUTO: 30.7 PG (ref 27–33.2)
MCH RBC QN AUTO: 31 PG (ref 27–33.2)
MCH RBC QN AUTO: 31.1 PG (ref 27–33.2)
MCH RBC QN AUTO: 31.3 PG (ref 27–33.2)
MCH RBC QN AUTO: 31.6 PG (ref 27–33.2)
MCH RBC QN AUTO: 31.7 PG (ref 27–33.2)
MCH RBC QN AUTO: 31.8 PG (ref 27–33.2)
MCH RBC QN AUTO: 32.4 PG (ref 27–33.2)
MCHC RBC AUTO-ENTMCNC: 33.8 G/DL (ref 31–37)
MCHC RBC AUTO-ENTMCNC: 33.9 G/DL (ref 31–37)
MCHC RBC AUTO-ENTMCNC: 34.5 G/DL (ref 31–37)
MCHC RBC AUTO-ENTMCNC: 34.6 G/DL (ref 31–37)
MCHC RBC AUTO-ENTMCNC: 34.7 G/DL (ref 31–37)
MCHC RBC AUTO-ENTMCNC: 35.3 G/DL (ref 31–37)
MCHC RBC AUTO-ENTMCNC: 35.8 G/DL (ref 31–37)
MCHC RBC AUTO-ENTMCNC: 35.9 G/DL (ref 31–37)
MCHC RBC AUTO-ENTMCNC: 36.2 G/DL (ref 31–37)
MCHC RBC AUTO-ENTMCNC: 36.6 G/DL (ref 31–37)
MCV RBC AUTO: 87.7 FL (ref 80–99)
MCV RBC AUTO: 88 FL (ref 80–99)
MCV RBC AUTO: 88.5 FL (ref 80–99)
MCV RBC AUTO: 88.6 FL (ref 80–99)
MCV RBC AUTO: 88.8 FL (ref 80–99)
MCV RBC AUTO: 89.8 FL (ref 80–99)
MCV RBC AUTO: 90.1 FL (ref 80–99)
MCV RBC AUTO: 91.5 FL (ref 80–99)
METHEMOGLOBIN: 0.5 % SAT (ref 0.4–1.5)
METHEMOGLOBIN: 0.8 % SAT (ref 0.4–1.5)
METHEMOGLOBIN: 1 % SAT (ref 0.4–1.5)
METHEMOGLOBIN: 1.2 % SAT (ref 0.4–1.5)
METHEMOGLOBIN: 1.4 % SAT (ref 0.4–1.5)
METHEMOGLOBIN: 1.8 % SAT (ref 0.4–1.5)
METHEMOGLOBIN: 2 % SAT (ref 0.4–1.5)
METHEMOGLOBIN: 2.1 % SAT (ref 0.4–1.5)
MONOCYTE % MANUAL: 4 %
MONOCYTE ABSOLUTE MANUAL: 0.54 X10(3) UL (ref 0.1–1)
MONOCYTES # BLD AUTO: 0.5 X10(3) UL (ref 0.1–1)
MONOCYTES # BLD AUTO: 0.98 X10(3) UL (ref 0.1–1)
MONOCYTES # BLD AUTO: 1.21 X10(3) UL (ref 0.1–1)
MONOCYTES # BLD AUTO: 1.29 X10(3) UL (ref 0.1–1)
MONOCYTES # BLD AUTO: 1.55 X10(3) UL (ref 0.1–1)
MONOCYTES # BLD AUTO: 2 X10(3) UL (ref 0.1–1)
MONOCYTES NFR BLD AUTO: 12.4 %
MONOCYTES NFR BLD AUTO: 4.4 %
MONOCYTES NFR BLD AUTO: 4.8 %
MONOCYTES NFR BLD AUTO: 8.6 %
MONOCYTES NFR BLD AUTO: 8.7 %
MONOCYTES NFR BLD AUTO: 9.4 %
MORPHOLOGY: NORMAL
MYELOCYTE %: 1 %
MYELOCYTE ABSOLUTE MANUAL: 0.13 X10(3) UL (ref ?–0.01)
NEUTROPHIL ABS PRELIM: 10.57 X10 (3) UL (ref 1.3–6.7)
NEUTROPHIL ABS PRELIM: 11.5 X10 (3) UL (ref 1.3–6.7)
NEUTROPHIL ABS PRELIM: 14.29 X10 (3) UL (ref 1.3–6.7)
NEUTROPHIL ABS PRELIM: 22.15 X10 (3) UL (ref 1.3–6.7)
NEUTROPHIL ABS PRELIM: 5.03 X10 (3) UL (ref 1.3–6.7)
NEUTROPHIL ABS PRELIM: 8.86 X10 (3) UL (ref 1.3–6.7)
NEUTROPHIL ABS PRELIM: 9.09 X10 (3) UL (ref 1.3–6.7)
NEUTROPHIL ABSOLUTE MANUAL: 10.85 X10(3) UL (ref 1.3–6.7)
NEUTROPHILS # BLD AUTO: 10.57 X10(3) UL (ref 1.3–6.7)
NEUTROPHILS # BLD AUTO: 11.5 X10(3) UL (ref 1.3–6.7)
NEUTROPHILS # BLD AUTO: 14.29 X10(3) UL (ref 1.3–6.7)
NEUTROPHILS # BLD AUTO: 22.15 X10(3) UL (ref 1.3–6.7)
NEUTROPHILS # BLD AUTO: 5.03 X10(3) UL (ref 1.3–6.7)
NEUTROPHILS # BLD AUTO: 8.86 X10(3) UL (ref 1.3–6.7)
NEUTROPHILS % MANUAL: 62 %
NEUTROPHILS NFR BLD AUTO: 48.5 %
NEUTROPHILS NFR BLD AUTO: 71.5 %
NEUTROPHILS NFR BLD AUTO: 76.5 %
NEUTROPHILS NFR BLD AUTO: 78.2 %
NEUTROPHILS NFR BLD AUTO: 79.6 %
NEUTROPHILS NFR BLD AUTO: 82.8 %
NITRIC OXIDE: 20 PPM
NITRIC OXIDE: 40 PPM
NRBC CALCULATED: 1
OPIATE URINE: NEGATIVE
OSMOLALITY SERPL CALC.SUM OF ELEC: 289 MOSM/KG (ref 275–295)
OSMOLALITY SERPL CALC.SUM OF ELEC: 293 MOSM/KG (ref 275–295)
OSMOLALITY SERPL CALC.SUM OF ELEC: 297 MOSM/KG (ref 275–295)
OSMOLALITY SERPL CALC.SUM OF ELEC: 303 MOSM/KG (ref 275–295)
OSMOLALITY SERPL CALC.SUM OF ELEC: 308 MOSM/KG (ref 275–295)
P AXIS: 45 DEGREES
P AXIS: 54 DEGREES
P AXIS: 54 DEGREES
P-R INTERVAL: 160 MS
P-R INTERVAL: 160 MS
P-R INTERVAL: 178 MS
P/F RATIO: 201.4 MMHG
P/F RATIO: 2034 MMHG
P/F RATIO: 40.4 MMHG
P/F RATIO: 41.9 MMHG
P/F RATIO: 415.1 MMHG
P/F RATIO: 48.4 MMHG
P/F RATIO: 670 MMHG
P/F RATIO: 686.3 MMHG
P/F RATIO: 706.5 MMHG
P/F RATIO: 72.2 MMHG
P/F RATIO: 996.4 MMHG
PATIENT TEMPERATURE: 94.3 F
PATIENT TEMPERATURE: 94.7 F
PATIENT TEMPERATURE: 95.4 F
PATIENT TEMPERATURE: 95.7 F
PATIENT TEMPERATURE: 96 F
PATIENT TEMPERATURE: 96.7 F
PATIENT TEMPERATURE: 97 F
PATIENT TEMPERATURE: 98.6 F
PCP URINE: NEGATIVE
PEEP: 10 CM H2O
PEEP: 12 CM H2O
PEEP: 13 CM H2O
PEEP: 18 CM H2O
PEEP: 5 CM H2O
PLATELET # BLD AUTO: 161 10(3)UL (ref 150–450)
PLATELET # BLD AUTO: 314 10(3)UL (ref 150–450)
PLATELET # BLD AUTO: 33 10(3)UL (ref 150–450)
PLATELET # BLD AUTO: 41 10(3)UL (ref 150–450)
PLATELET # BLD AUTO: 411 10(3)UL (ref 150–450)
PLATELET # BLD AUTO: 50 10(3)UL (ref 150–450)
PLATELET # BLD AUTO: 63 10(3)UL (ref 150–450)
PLATELET # BLD AUTO: 75 10(3)UL (ref 150–450)
PLATELET # BLD AUTO: 87 10(3)UL (ref 150–450)
PLATELET # BLD AUTO: 93 10(3)UL (ref 150–450)
PLATELET # BLD AUTO: 99 10(3)UL (ref 150–450)
PLATELET MORPHOLOGY: NORMAL
POTASSIUM BLOOD GAS: 2.4 MMOL/L (ref 3.6–5.1)
POTASSIUM BLOOD GAS: 3 MMOL/L (ref 3.6–5.1)
POTASSIUM BLOOD GAS: 3.9 MMOL/L (ref 3.6–5.1)
POTASSIUM BLOOD GAS: 4.2 MMOL/L (ref 3.6–5.1)
POTASSIUM BLOOD GAS: 4.2 MMOL/L (ref 3.6–5.1)
POTASSIUM BLOOD GAS: 4.4 MMOL/L (ref 3.6–5.1)
POTASSIUM SERPL-SCNC: 2.3 MMOL/L (ref 3.6–5.1)
POTASSIUM SERPL-SCNC: 3.1 MMOL/L (ref 3.6–5.1)
POTASSIUM SERPL-SCNC: 3.2 MMOL/L (ref 3.6–5.1)
POTASSIUM SERPL-SCNC: 3.2 MMOL/L (ref 3.6–5.1)
POTASSIUM SERPL-SCNC: 3.7 MMOL/L (ref 3.6–5.1)
POTASSIUM SERPL-SCNC: 4 MMOL/L (ref 3.6–5.1)
POTASSIUM SERPL-SCNC: 4 MMOL/L (ref 3.6–5.1)
POTASSIUM SERPL-SCNC: 4.3 MMOL/L (ref 3.6–5.1)
POTASSIUM SERPL-SCNC: 4.4 MMOL/L (ref 3.6–5.1)
POTASSIUM SERPL-SCNC: 4.4 MMOL/L (ref 3.6–5.1)
POTASSIUM SERPL-SCNC: 4.9 MMOL/L (ref 3.6–5.1)
POTASSIUM SERPL-SCNC: 4.9 MMOL/L (ref 3.6–5.1)
POTASSIUM SERPL-SCNC: 5 MMOL/L (ref 3.6–5.1)
POTASSIUM SERPL-SCNC: 5.1 MMOL/L (ref 3.6–5.1)
POTASSIUM SERPL-SCNC: 5.3 MMOL/L (ref 3.6–5.1)
POTASSIUM SERPL-SCNC: 5.4 MMOL/L (ref 3.6–5.1)
POTASSIUM SERPL-SCNC: 6.1 MMOL/L (ref 3.6–5.1)
POTASSIUM SERPL-SCNC: 6.9 MMOL/L (ref 3.6–5.1)
PSA SERPL DL<=0.01 NG/ML-MCNC: 13.2 SECONDS (ref 12.4–14.7)
PSA SERPL DL<=0.01 NG/ML-MCNC: 16.6 SECONDS (ref 12.4–14.7)
PSA SERPL DL<=0.01 NG/ML-MCNC: 17.3 SECONDS (ref 12.4–14.7)
PSA SERPL DL<=0.01 NG/ML-MCNC: 20.8 SECONDS (ref 12.4–14.7)
PSA SERPL DL<=0.01 NG/ML-MCNC: 23.5 SECONDS (ref 12.4–14.7)
Q-T INTERVAL: 284 MS
Q-T INTERVAL: 284 MS
Q-T INTERVAL: 334 MS
QRS DURATION: 70 MS
QRS DURATION: 88 MS
QRS DURATION: 88 MS
QTC CALCULATION (BEZET): 399 MS
QTC CALCULATION (BEZET): 416 MS
QTC CALCULATION (BEZET): 416 MS
R AXIS: 114 DEGREES
R AXIS: 114 DEGREES
R AXIS: 117 DEGREES
RBC # BLD AUTO: 2.12 X10(6)UL (ref 4.3–5.7)
RBC # BLD AUTO: 2.18 X10(6)UL (ref 4.3–5.7)
RBC # BLD AUTO: 2.19 X10(6)UL (ref 4.3–5.7)
RBC # BLD AUTO: 2.25 X10(6)UL (ref 4.3–5.7)
RBC # BLD AUTO: 2.36 X10(6)UL (ref 4.3–5.7)
RBC # BLD AUTO: 2.62 X10(6)UL (ref 4.3–5.7)
RBC # BLD AUTO: 2.94 X10(6)UL (ref 4.3–5.7)
RBC # BLD AUTO: 3.22 X10(6)UL (ref 4.3–5.7)
RBC # BLD AUTO: 5.06 X10(6)UL (ref 4.3–5.7)
RBC # BLD AUTO: 5.2 X10(6)UL (ref 4.3–5.7)
RED CELL DISTRIBUTION WIDTH-SD: 39.6 FL (ref 35.1–46.3)
RED CELL DISTRIBUTION WIDTH-SD: 41.5 FL (ref 35.1–46.3)
RED CELL DISTRIBUTION WIDTH-SD: 41.8 FL (ref 35.1–46.3)
RED CELL DISTRIBUTION WIDTH-SD: 44.3 FL (ref 35.1–46.3)
RED CELL DISTRIBUTION WIDTH-SD: 44.4 FL (ref 35.1–46.3)
RED CELL DISTRIBUTION WIDTH-SD: 44.8 FL (ref 35.1–46.3)
RED CELL DISTRIBUTION WIDTH-SD: 45.3 FL (ref 35.1–46.3)
RED CELL DISTRIBUTION WIDTH-SD: 47.5 FL (ref 35.1–46.3)
RED CELL DISTRIBUTION WIDTH-SD: 47.7 FL (ref 35.1–46.3)
RED CELL DISTRIBUTION WIDTH-SD: 47.8 FL (ref 35.1–46.3)
RH BLOOD TYPE: POSITIVE
SODIUM BLOOD GAS: 134 MMOL/L (ref 136–144)
SODIUM BLOOD GAS: 139 MMOL/L (ref 136–144)
SODIUM BLOOD GAS: 139 MMOL/L (ref 136–144)
SODIUM BLOOD GAS: 141 MMOL/L (ref 136–144)
SODIUM BLOOD GAS: 142 MMOL/L (ref 136–144)
SODIUM BLOOD GAS: 145 MMOL/L (ref 136–144)
SODIUM SERPL-SCNC: 136 MMOL/L (ref 136–144)
SODIUM SERPL-SCNC: 136 MMOL/L (ref 136–144)
SODIUM SERPL-SCNC: 137 MMOL/L (ref 136–144)
SODIUM SERPL-SCNC: 138 MMOL/L (ref 136–144)
SODIUM SERPL-SCNC: 138 MMOL/L (ref 136–144)
SODIUM SERPL-SCNC: 139 MMOL/L (ref 136–144)
SODIUM SERPL-SCNC: 140 MMOL/L (ref 136–144)
SODIUM SERPL-SCNC: 142 MMOL/L (ref 136–144)
SODIUM SERPL-SCNC: 142 MMOL/L (ref 136–144)
SODIUM SERPL-SCNC: 143 MMOL/L (ref 136–144)
SODIUM SERPL-SCNC: 143 MMOL/L (ref 136–144)
SODIUM SERPL-SCNC: 146 MMOL/L (ref 136–144)
SODIUM SERPL-SCNC: 146 MMOL/L (ref 136–144)
SODIUM SERPL-SCNC: 147 MMOL/L (ref 136–144)
T AXIS: -26 DEGREES
T AXIS: 45 DEGREES
T AXIS: 45 DEGREES
TIDAL VOLUME: 350 ML
TIDAL VOLUME: 400 ML
TIDAL VOLUME: 480 ML
TIDAL VOLUME: 500 ML
TIDAL VOLUME: 600 ML
TOTAL CELLS COUNTED: 100
TOTAL HEMOGLOBIN: 10 G/DL (ref 13.2–17.3)
TOTAL HEMOGLOBIN: 10.6 G/DL (ref 13.2–17.3)
TOTAL HEMOGLOBIN: 10.7 G/DL (ref 13.2–17.3)
TOTAL HEMOGLOBIN: 11.4 G/DL (ref 13.2–17.3)
TOTAL HEMOGLOBIN: 11.8 G/DL (ref 13.2–17.3)
TOTAL HEMOGLOBIN: 14.3 G/DL (ref 13.2–17.3)
TOTAL HEMOGLOBIN: 14.9 G/DL (ref 13.2–17.3)
TOTAL HEMOGLOBIN: 15 G/DL (ref 13.2–17.3)
TOTAL HEMOGLOBIN: 15 G/DL (ref 13.2–17.3)
TOTAL HEMOGLOBIN: 15.2 G/DL (ref 13.2–17.3)
TOTAL HEMOGLOBIN: 15.9 G/DL (ref 13.2–17.3)
TOTAL HEMOGLOBIN: 6.8 G/DL (ref 13.2–17.3)
TOTAL HEMOGLOBIN: 8.2 G/DL (ref 13.2–17.3)
TROPONIN I SERPL-MCNC: <0.046 NG/ML (ref ?–0.05)
TROPONIN I SERPL-MCNC: >200 NG/ML (ref ?–0.05)
VENOUS BASE EXCESS: -1.8
VENOUS BLOOD GAS HCO3: 26.2 MEQ/L (ref 23–27)
VENOUS O2 SAT CALC: 41 % (ref 72–78)
VENOUS O2 SATURATION: 37 % (ref 72–78)
VENOUS PCO2: 61 MM HG (ref 38–50)
VENOUS PH: 7.25 (ref 7.33–7.43)
VENOUS PO2: 28 MM HG (ref 30–50)
VENT RATE: 16 /MIN
VENT RATE: 18 /MIN
VENT RATE: 20 /MIN
VENT RATE: 24 /MIN
VENT RATE: 32 /MIN
VENTRICULAR RATE: 129 BPM
VENTRICULAR RATE: 129 BPM
VENTRICULAR RATE: 86 BPM
WBC # BLD AUTO: 10.4 X10(3) UL (ref 4–13)
WBC # BLD AUTO: 10.7 X10(3) UL (ref 4–13)
WBC # BLD AUTO: 11.3 X10(3) UL (ref 4–13)
WBC # BLD AUTO: 11.6 X10(3) UL (ref 4–13)
WBC # BLD AUTO: 13.4 X10(3) UL (ref 4–13)
WBC # BLD AUTO: 13.8 X10(3) UL (ref 4–13)
WBC # BLD AUTO: 14.5 X10(3) UL (ref 4–13)
WBC # BLD AUTO: 16.1 X10(3) UL (ref 4–13)
WBC # BLD AUTO: 18 X10(3) UL (ref 4–13)
WBC # BLD AUTO: 26.8 X10(3) UL (ref 4–13)

## 2018-01-01 PROCEDURE — B2111ZZ FLUOROSCOPY OF MULTIPLE CORONARY ARTERIES USING LOW OSMOLAR CONTRAST: ICD-10-PCS | Performed by: INTERNAL MEDICINE

## 2018-01-01 PROCEDURE — 99291 CRITICAL CARE FIRST HOUR: CPT | Performed by: OTHER

## 2018-01-01 PROCEDURE — 93925 LOWER EXTREMITY STUDY: CPT | Performed by: SURGERY

## 2018-01-01 PROCEDURE — 71045 X-RAY EXAM CHEST 1 VIEW: CPT | Performed by: INTERNAL MEDICINE

## 2018-01-01 PROCEDURE — B41F1ZZ FLUOROSCOPY OF RIGHT LOWER EXTREMITY ARTERIES USING LOW OSMOLAR CONTRAST: ICD-10-PCS | Performed by: INTERNAL MEDICINE

## 2018-01-01 PROCEDURE — 02HA3RZ INSERTION OF SHORT-TERM EXTERNAL HEART ASSIST SYSTEM INTO HEART, PERCUTANEOUS APPROACH: ICD-10-PCS | Performed by: INTERNAL MEDICINE

## 2018-01-01 PROCEDURE — 4A023N8 MEASUREMENT OF CARDIAC SAMPLING AND PRESSURE, BILATERAL, PERCUTANEOUS APPROACH: ICD-10-PCS | Performed by: INTERNAL MEDICINE

## 2018-01-01 PROCEDURE — 02HP32Z INSERTION OF MONITORING DEVICE INTO PULMONARY TRUNK, PERCUTANEOUS APPROACH: ICD-10-PCS | Performed by: INTERNAL MEDICINE

## 2018-01-01 PROCEDURE — 99291 CRITICAL CARE FIRST HOUR: CPT | Performed by: HOSPITALIST

## 2018-01-01 PROCEDURE — 03Q80ZZ REPAIR LEFT BRACHIAL ARTERY, OPEN APPROACH: ICD-10-PCS | Performed by: SURGERY

## 2018-01-01 PROCEDURE — 5A1945Z RESPIRATORY VENTILATION, 24-96 CONSECUTIVE HOURS: ICD-10-PCS | Performed by: HOSPITALIST

## 2018-01-01 PROCEDURE — 30233N1 TRANSFUSION OF NONAUTOLOGOUS RED BLOOD CELLS INTO PERIPHERAL VEIN, PERCUTANEOUS APPROACH: ICD-10-PCS | Performed by: HOSPITALIST

## 2018-01-01 PROCEDURE — 93308 TTE F-UP OR LMTD: CPT | Performed by: INTERNAL MEDICINE

## 2018-01-01 PROCEDURE — 0KND0ZZ RELEASE LEFT HAND MUSCLE, OPEN APPROACH: ICD-10-PCS | Performed by: SURGERY

## 2018-01-01 PROCEDURE — 93307 TTE W/O DOPPLER COMPLETE: CPT | Performed by: INTERNAL MEDICINE

## 2018-01-01 PROCEDURE — 027035Z DILATION OF CORONARY ARTERY, ONE ARTERY WITH TWO DRUG-ELUTING INTRALUMINAL DEVICES, PERCUTANEOUS APPROACH: ICD-10-PCS | Performed by: INTERNAL MEDICINE

## 2018-01-01 PROCEDURE — 5A1D90Z PERFORMANCE OF URINARY FILTRATION, CONTINUOUS, GREATER THAN 18 HOURS PER DAY: ICD-10-PCS | Performed by: HOSPITALIST

## 2018-01-01 PROCEDURE — 99233 SBSQ HOSP IP/OBS HIGH 50: CPT | Performed by: HOSPITALIST

## 2018-01-01 PROCEDURE — 99291 CRITICAL CARE FIRST HOUR: CPT | Performed by: INTERNAL MEDICINE

## 2018-01-01 PROCEDURE — 0KNB0ZZ RELEASE LEFT LOWER ARM AND WRIST MUSCLE, OPEN APPROACH: ICD-10-PCS | Performed by: SURGERY

## 2018-01-01 PROCEDURE — 99233 SBSQ HOSP IP/OBS HIGH 50: CPT | Performed by: INTERNAL MEDICINE

## 2018-01-01 PROCEDURE — 30233R1 TRANSFUSION OF NONAUTOLOGOUS PLATELETS INTO PERIPHERAL VEIN, PERCUTANEOUS APPROACH: ICD-10-PCS | Performed by: HOSPITALIST

## 2018-01-01 PROCEDURE — 5A0221D ASSISTANCE WITH CARDIAC OUTPUT USING IMPELLER PUMP, CONTINUOUS: ICD-10-PCS | Performed by: INTERNAL MEDICINE

## 2018-01-01 PROCEDURE — 99231 SBSQ HOSP IP/OBS SF/LOW 25: CPT | Performed by: HOSPITALIST

## 2018-01-01 PROCEDURE — 90945 DIALYSIS ONE EVALUATION: CPT | Performed by: INTERNAL MEDICINE

## 2018-01-01 PROCEDURE — 93306 TTE W/DOPPLER COMPLETE: CPT | Performed by: INTERNAL MEDICINE

## 2018-01-01 PROCEDURE — 5A15223 EXTRACORPOREAL MEMBRANE OXYGENATION, CONTINUOUS: ICD-10-PCS | Performed by: HOSPITALIST

## 2018-01-01 PROCEDURE — 0BH17EZ INSERTION OF ENDOTRACHEAL AIRWAY INTO TRACHEA, VIA NATURAL OR ARTIFICIAL OPENING: ICD-10-PCS | Performed by: EMERGENCY MEDICINE

## 2018-01-01 PROCEDURE — 0KN80ZZ RELEASE LEFT UPPER ARM MUSCLE, OPEN APPROACH: ICD-10-PCS | Performed by: SURGERY

## 2018-01-01 PROCEDURE — 02H633Z INSERTION OF INFUSION DEVICE INTO RIGHT ATRIUM, PERCUTANEOUS APPROACH: ICD-10-PCS | Performed by: INTERNAL MEDICINE

## 2018-01-01 RX ORDER — POLYETHYLENE GLYCOL 3350 17 G/17G
17 POWDER, FOR SOLUTION ORAL DAILY PRN
Status: DISCONTINUED | OUTPATIENT
Start: 2018-01-01 | End: 2018-01-01

## 2018-01-01 RX ORDER — CHLORHEXIDINE GLUCONATE 0.12 MG/ML
15 RINSE ORAL
Status: DISCONTINUED | OUTPATIENT
Start: 2018-01-01 | End: 2018-01-01

## 2018-01-01 RX ORDER — SODIUM CHLORIDE 9 MG/ML
INJECTION, SOLUTION INTRAVENOUS ONCE
Status: DISCONTINUED | OUTPATIENT
Start: 2018-01-01 | End: 2018-01-01

## 2018-01-01 RX ORDER — HEPARIN SODIUM 5000 [USP'U]/ML
INJECTION, SOLUTION INTRAVENOUS; SUBCUTANEOUS
Status: COMPLETED
Start: 2018-01-01 | End: 2018-01-01

## 2018-01-01 RX ORDER — FAMOTIDINE 10 MG/ML
20 INJECTION, SOLUTION INTRAVENOUS 2 TIMES DAILY
Status: DISCONTINUED | OUTPATIENT
Start: 2018-01-01 | End: 2018-01-01

## 2018-01-01 RX ORDER — DEXTROSE MONOHYDRATE 25 G/50ML
50 INJECTION, SOLUTION INTRAVENOUS
Status: DISCONTINUED | OUTPATIENT
Start: 2018-01-01 | End: 2018-01-01

## 2018-01-01 RX ORDER — HEPARIN SODIUM 1000 [USP'U]/ML
1.5 INJECTION, SOLUTION INTRAVENOUS; SUBCUTANEOUS AS NEEDED
Status: DISCONTINUED | OUTPATIENT
Start: 2018-01-01 | End: 2018-01-01

## 2018-01-01 RX ORDER — DEXMEDETOMIDINE HYDROCHLORIDE 4 UG/ML
INJECTION, SOLUTION INTRAVENOUS CONTINUOUS
Status: DISCONTINUED | OUTPATIENT
Start: 2018-01-01 | End: 2018-01-01

## 2018-01-01 RX ORDER — HEPARIN SODIUM 5000 [USP'U]/ML
INJECTION, SOLUTION INTRAVENOUS; SUBCUTANEOUS
Status: DISCONTINUED
Start: 2018-01-01 | End: 2018-01-01 | Stop reason: WASHOUT

## 2018-01-01 RX ORDER — CEFAZOLIN SODIUM/WATER 2 G/20 ML
2 SYRINGE (ML) INTRAVENOUS EVERY 8 HOURS
Status: COMPLETED | OUTPATIENT
Start: 2018-01-01 | End: 2018-01-01

## 2018-01-01 RX ORDER — SODIUM CHLORIDE 9 MG/ML
INJECTION, SOLUTION INTRAVENOUS ONCE
Status: COMPLETED | OUTPATIENT
Start: 2018-01-01 | End: 2018-01-01

## 2018-01-01 RX ORDER — BUSPIRONE HYDROCHLORIDE 15 MG/1
30 TABLET ORAL EVERY 8 HOURS
Status: DISCONTINUED | OUTPATIENT
Start: 2018-01-01 | End: 2018-01-01

## 2018-01-01 RX ORDER — PHENYLEPHRINE HCL IN 0.9% NACL 50MG/250ML
PLASTIC BAG, INJECTION (ML) INTRAVENOUS
Status: DISPENSED
Start: 2018-01-01 | End: 2018-01-01

## 2018-01-01 RX ORDER — NALOXONE HYDROCHLORIDE 0.4 MG/ML
INJECTION, SOLUTION INTRAMUSCULAR; INTRAVENOUS; SUBCUTANEOUS
Status: COMPLETED | OUTPATIENT
Start: 2018-01-01 | End: 2018-01-01

## 2018-01-01 RX ORDER — DEXTROSE AND SODIUM CHLORIDE 5; .45 G/100ML; G/100ML
INJECTION, SOLUTION INTRAVENOUS CONTINUOUS
Status: DISCONTINUED | OUTPATIENT
Start: 2018-01-01 | End: 2018-01-01

## 2018-01-01 RX ORDER — SODIUM CHLORIDE 9 MG/ML
INJECTION, SOLUTION INTRAVENOUS CONTINUOUS
Status: ACTIVE | OUTPATIENT
Start: 2018-01-01 | End: 2018-01-01

## 2018-01-01 RX ORDER — ONDANSETRON 2 MG/ML
4 INJECTION INTRAMUSCULAR; INTRAVENOUS EVERY 6 HOURS PRN
Status: DISCONTINUED | OUTPATIENT
Start: 2018-01-01 | End: 2018-01-01

## 2018-01-01 RX ORDER — LIDOCAINE HYDROCHLORIDE 10 MG/ML
INJECTION, SOLUTION EPIDURAL; INFILTRATION; INTRACAUDAL; PERINEURAL
Status: COMPLETED
Start: 2018-01-01 | End: 2018-01-01

## 2018-01-01 RX ORDER — ACETAMINOPHEN 650 MG/1
650 SUPPOSITORY RECTAL EVERY 6 HOURS SCHEDULED
Status: DISCONTINUED | OUTPATIENT
Start: 2018-01-01 | End: 2018-01-01

## 2018-01-01 RX ORDER — LORAZEPAM 2 MG/ML
1 INJECTION INTRAMUSCULAR ONCE
Status: COMPLETED | OUTPATIENT
Start: 2018-01-01 | End: 2018-01-01

## 2018-01-01 RX ORDER — ALBUMIN, HUMAN INJ 5% 5 %
250 SOLUTION INTRAVENOUS
Status: DISCONTINUED | OUTPATIENT
Start: 2018-01-01 | End: 2018-01-01

## 2018-01-01 RX ORDER — ACETAMINOPHEN 650 MG/1
650 SUPPOSITORY RECTAL EVERY 6 HOURS PRN
Status: DISCONTINUED | OUTPATIENT
Start: 2018-01-01 | End: 2018-01-01

## 2018-01-01 RX ORDER — MIDAZOLAM HYDROCHLORIDE 1 MG/ML
2 INJECTION INTRAMUSCULAR; INTRAVENOUS EVERY 5 MIN PRN
Status: DISCONTINUED | OUTPATIENT
Start: 2018-01-01 | End: 2018-01-01

## 2018-01-01 RX ORDER — BUMETANIDE 0.25 MG/ML
2 INJECTION, SOLUTION INTRAMUSCULAR; INTRAVENOUS ONCE
Status: COMPLETED | OUTPATIENT
Start: 2018-01-01 | End: 2018-01-01

## 2018-01-01 RX ORDER — ALBUMIN, HUMAN INJ 5% 5 %
SOLUTION INTRAVENOUS
Status: DISPENSED
Start: 2018-01-01 | End: 2018-01-01

## 2018-01-01 RX ORDER — DOPAMINE HYDROCHLORIDE 320 MG/100ML
INJECTION, SOLUTION INTRAVENOUS
Status: COMPLETED | OUTPATIENT
Start: 2018-01-01 | End: 2018-01-01

## 2018-01-01 RX ORDER — MINERAL OIL AND PETROLATUM 150; 830 MG/G; MG/G
OINTMENT OPHTHALMIC 2 TIMES DAILY
Status: DISCONTINUED | OUTPATIENT
Start: 2018-01-01 | End: 2018-01-01

## 2018-01-01 RX ORDER — MIDAZOLAM HYDROCHLORIDE 1 MG/ML
2 INJECTION INTRAMUSCULAR; INTRAVENOUS
Status: DISCONTINUED | OUTPATIENT
Start: 2018-01-01 | End: 2018-01-01

## 2018-01-01 RX ORDER — MEPERIDINE HYDROCHLORIDE 25 MG/ML
25 INJECTION INTRAMUSCULAR; INTRAVENOUS; SUBCUTANEOUS
Status: DISCONTINUED | OUTPATIENT
Start: 2018-01-01 | End: 2018-01-01

## 2018-01-01 RX ORDER — METOCLOPRAMIDE HYDROCHLORIDE 5 MG/ML
10 INJECTION INTRAMUSCULAR; INTRAVENOUS EVERY 8 HOURS PRN
Status: DISCONTINUED | OUTPATIENT
Start: 2018-01-01 | End: 2018-01-01

## 2018-01-01 RX ORDER — BISACODYL 10 MG
10 SUPPOSITORY, RECTAL RECTAL
Status: DISCONTINUED | OUTPATIENT
Start: 2018-01-01 | End: 2018-01-01

## 2018-01-01 RX ORDER — ACETAMINOPHEN 160 MG/5ML
650 SOLUTION ORAL EVERY 6 HOURS PRN
Status: DISCONTINUED | OUTPATIENT
Start: 2018-01-01 | End: 2018-01-01

## 2018-01-01 RX ORDER — FUROSEMIDE 10 MG/ML
INJECTION INTRAMUSCULAR; INTRAVENOUS
Status: COMPLETED
Start: 2018-01-01 | End: 2018-01-01

## 2018-01-01 RX ORDER — ACETAMINOPHEN 325 MG/1
650 TABLET ORAL EVERY 6 HOURS PRN
Status: DISCONTINUED | OUTPATIENT
Start: 2018-01-01 | End: 2018-01-01

## 2018-01-01 RX ORDER — ACETAMINOPHEN 160 MG/5ML
650 SOLUTION ORAL EVERY 6 HOURS SCHEDULED
Status: DISCONTINUED | OUTPATIENT
Start: 2018-01-01 | End: 2018-01-01

## 2018-01-01 RX ORDER — ALBUMIN, HUMAN INJ 5% 5 %
500 SOLUTION INTRAVENOUS ONCE
Status: COMPLETED | OUTPATIENT
Start: 2018-01-01 | End: 2018-01-01

## 2018-01-01 RX ORDER — MIDAZOLAM HYDROCHLORIDE 1 MG/ML
INJECTION INTRAMUSCULAR; INTRAVENOUS
Status: DISCONTINUED
Start: 2018-01-01 | End: 2018-01-01 | Stop reason: WASHOUT

## 2018-01-01 RX ORDER — AMIODARONE HYDROCHLORIDE 50 MG/ML
INJECTION, SOLUTION INTRAVENOUS
Status: COMPLETED | OUTPATIENT
Start: 2018-01-01 | End: 2018-01-01

## 2018-01-01 RX ORDER — POTASSIUM CHLORIDE 14.9 MG/ML
20 INJECTION INTRAVENOUS ONCE
Status: COMPLETED | OUTPATIENT
Start: 2018-01-01 | End: 2018-01-01

## 2018-01-01 RX ORDER — NOREPINEPHRINE BITARTRATE 1 MG/ML
INJECTION, SOLUTION INTRAVENOUS
Status: COMPLETED
Start: 2018-01-01 | End: 2018-01-01

## 2018-01-01 RX ORDER — ALBUMIN, HUMAN INJ 5% 5 %
SOLUTION INTRAVENOUS
Status: COMPLETED
Start: 2018-01-01 | End: 2018-01-01

## 2018-01-01 RX ORDER — LORAZEPAM 2 MG/ML
INJECTION INTRAMUSCULAR
Status: DISCONTINUED
Start: 2018-01-01 | End: 2018-01-01 | Stop reason: WASHOUT

## 2018-01-01 RX ORDER — HEPARIN SODIUM 1000 [USP'U]/ML
INJECTION, SOLUTION INTRAVENOUS; SUBCUTANEOUS
Status: COMPLETED | OUTPATIENT
Start: 2018-01-01 | End: 2018-01-01

## 2018-01-01 RX ORDER — CLOPIDOGREL BISULFATE 75 MG/1
75 TABLET ORAL DAILY
Status: DISCONTINUED | OUTPATIENT
Start: 2018-01-01 | End: 2018-01-01

## 2018-01-01 RX ORDER — SODIUM PHOSPHATE, DIBASIC AND SODIUM PHOSPHATE, MONOBASIC 7; 19 G/133ML; G/133ML
1 ENEMA RECTAL ONCE AS NEEDED
Status: DISCONTINUED | OUTPATIENT
Start: 2018-01-01 | End: 2018-01-01

## 2018-01-01 RX ORDER — HEPARIN SODIUM AND DEXTROSE 10000; 5 [USP'U]/100ML; G/100ML
400 INJECTION INTRAVENOUS CONTINUOUS
Status: DISCONTINUED | OUTPATIENT
Start: 2018-01-01 | End: 2018-01-01

## 2018-09-05 PROBLEM — I46.9 CARDIAC ARREST (HCC): Status: ACTIVE | Noted: 2018-01-01

## 2018-09-06 PROBLEM — I21.3 ST ELEVATION MYOCARDIAL INFARCTION (STEMI), UNSPECIFIED ARTERY (HCC): Status: ACTIVE | Noted: 2018-01-01

## 2018-09-06 NOTE — CONSULTS
[address] Patient Status:  Inpatient    1980 MRN [id number]   [facility] 6NE-A Attending Reymundo Guan MD   Hosp Day # 0 PCP No primary care provider on file.      Date of Admission: 2018  Admission Arley Ihsan mcg/kg, Intravenous, Once **FOLLOWED BY** cangrelor tetrasodium (KENGREAL) 50 mg in sodium chloride 0.9 % 250 mL IVPB for BRIDGING, 4 mcg/kg/min, Intravenous, Continuous  •  acetaminophen (TYLENOL) 160 MG/5ML oral liquid 650 mg, 650 mg, Oral, 4 times per d Results  Component Value Date   WBC 26.8 09/06/2018   RBC 5.06 09/06/2018   HGB 15.7 09/06/2018   HCT 46.3 09/06/2018   MCV 91.5 09/06/2018   MCH 31.0 09/06/2018   MCHC 33.9 09/06/2018   RDW 12.4 09/06/2018   .0 09/06/2018       Lab Results  Compone give amps prn to help with severe acidosis  -unable to compensate from pulm perspective due to pulm edema  -trend lactate/ABG  5. H/o ETOH abuse- check ethanol level  -on precedex for sedation  -monitor for signs of w/drawal  6.  Neuro- at risk for anoxic i

## 2018-09-06 NOTE — OPERATIVE REPORT
Full note dictated,    12 F arterial cannula placed in RFA  22 F venous cannula placed in RCV    Dialysis catheter placed in left CFV    Clint Fairchild MD

## 2018-09-06 NOTE — ANESTHESIA POSTPROCEDURE EVALUATION
[facility]    David Monk Patient Status:  Inpatient   Age/Gender 34year old male MRN [id number]   [facility] 6NE-A Attending Eduarda Mccormick MD   Hosp Day # 0 PCP No primary care provider on file.        Anesthesia Post-op Note

## 2018-09-06 NOTE — RESPIRATORY THERAPY NOTE
EtCO2 set up in line with vent, good waveform and reading 28  Vent changes were made, current settings are, 32/480/100/+18 and Nitric is still set to 40ppm.

## 2018-09-06 NOTE — H&P
[facility]  History and Physical     Joni Sawyer Patient Status:  Emergency    1980 MRN [id number]   Location [address] [facility] Attending No att. providers found   [address] Day # 0 PCP No primary care provider on f edema or cyanosis. Integument: No rashes or lesions. Psychiatric: Appropriate mood and affect.       Diagnostic Data:      Labs:  Recent Labs   Lab  09/05/18   2312   WBC  10.4   HGB  15.8   MCV  89.8   PLT  314.0   INR  0.96       Recent Labs   Lab  09/

## 2018-09-06 NOTE — PROGRESS NOTES
[facility]  Neurocritical Care APRN Progress Note    NAME: Joni Sawyer - ROOM: 1869/3287-J - MRN: [id number] - Age: 34year old - :    History Of Present Illness:  Joni Sawyer is a 34year old male with PMH admission:  No results found.     Labs:    Lab Results  Component Value Date   WBC 26.8 09/06/2018   HGB 15.7 09/06/2018   HCT 46.3 09/06/2018   .0 09/06/2018   CREATSERUM 1.25 09/05/2018   BUN 14 09/05/2018    09/05/2018   K 3.1 09/05/2018   C

## 2018-09-06 NOTE — CONSULTS
Cardiology Consult       NAME: David Peralta - ROOM: [facility] IVS/ IVS Jesús Ceron - MRN: [id number] - Age: 34year old - :  1980    Date of Admission: 2018 11:02 PM  Admission Diagnosis: Cardiac arrest ([facility] [address] [I46.9]  ST elevation myocardial infarct

## 2018-09-06 NOTE — ED INITIAL ASSESSMENT (HPI)
Per pts family pt was playing volleyball tonight and c/o of intense heart burn and called parents to take him to ER. In route to ER pt became unresponsive.

## 2018-09-06 NOTE — PROGRESS NOTES
Cardiology Cardiac Intervention Note        9/6/2018   2:31 AM          Procedure Performed: LHC, RHC, COR and ISELA Stent LAD     Impella placement     Indication: AMI          Cardiologist: Ivana Gutierrez.  Lety Sotelo MD, [facility] [place]          Conscious Sedation Given:  ye

## 2018-09-06 NOTE — ANESTHESIA PREPROCEDURE EVALUATION
PRE-OP EVALUATION    Patient Name: Zonia Samuels    Pre-op Diagnosis: * No pre-op diagnosis entered *    * No procedures listed *    * No surgeons found in log *    Pre-op vitals reviewed.   Pulse: 118  Resp: 32  BP: 83/57  FiO2 (%): 100 %  SpO2: 91 times per day   Or      acetaminophen (TYLENOL) 650 MG rectal suppository 650 mg 650 mg Rectal 4 times per day   BusPIRone HCl (BUSPAR) tab 30 mg 30 mg Oral Q8H   fentaNYL citrate (SUBLIMAZE) 0.05 MG/ML injection 25-50 mcg 25-50 mcg Intravenous Q1H PRN   M injection 20 mg 20 mg Intravenous BID   Norepinephrine Bitartrate (LEVOPHED) 32 mg in dextrose 5 % 250 mL infusion 0.5-30 mcg/min Intravenous Continuous   vasopressin (PITRESSIN) 20 Units in sodium chloride 0.9 % 50 mL infusion for shock 0.01-0.04 Units/mi Intravenous Code/Trauma Med   [COMPLETED] norepinephrine (LEVOPHED) 4 mg/250 ml premix infusion  Intravenous Code/Trauma Continuous Med   [COMPLETED] Amiodarone HCl (CORDARONE) 900 mg in dextrose 5 % 250 mL, sodium chloride 0.9 % 250 mL infusion  Intraveno Pulmonary                  (+) coarse breath sounds   Other findings            ASA: 5 and emergent  Plan: general  NPO status verified and patient meets guidelines.           Plan/risks discussed with: father and sibling                Present on Admission

## 2018-09-06 NOTE — PAYOR COMM NOTE
--------------  ADMISSION REVIEW     Payor: MEDICAID  Subscriber #:  [id number]  Authorization Number: N/A    Admit date: 9/6/18  Admit time: 6463       Admitting Physician: Morteza Clemens MD  Attending Physician:  Unique Caruso MD  Primary Care Physician contribute to the exclusion of venous thromboembolism with a negative predictive value of approximately 95% when results are used as part of the total clinical evaluation of the patient.    PROTHROMBIN TIME (PT) - Normal   CBC WITH DIFFERENTIAL WITH PLATELE in dextrose 5 % 100 mL bolus     Date Action Dose Route User    9/6/2018 8686 Rate/Dose Change 150 mg Intravenous Henry Tamez RN      Amiodarone HCl (CORDARONE) 900 mg in dextrose 5 % 250 mL, sodium chloride 0.9 % 250 mL infusion     Date Action Dose Hi-Risk Rate/Dose Verify 3 mcg/kg/min × 89.8 kg Intravenous Buffy Rangel RN    9/6/2018 4265 New Bag 3 mcg/kg/min × 89.8 kg Intravenous Buffy Rangel RN      Dexmedetomidine HCl in NaCl (PRECEDEX) 400 MCG/100ML premix infusion     Date Action Dose Route Meenu Escalona RN      fentaNYL citrate (SUBLIMAZE) 1,000 mcg in sodium chloride 0.9 % 100 mL infusion     Date Action Dose Route User    9/6/2018 0823 Hi-Risk Rate/Dose Verify 48 mcg/hr Intravenous Patrick Grady RN    9/6/2018 1213 New Bag 50 mcg/hr I infusion     Date Action Dose Route User    9/6/2018 0400 New Bag 30 mcg/min Intravenous Cha Hughes RN    9/5/2018 3850 New Bag 30 mcg/min Intravenous Rajwinder Correa      sodium bicarbonate injection     Date Action Dose Route User    9/5/2018 2 progress with above interventions  2.  CV- STEMI due to LAD occlusion with subsequent cardiogenic shock  -s/p PTCA/stent  -Impella in place  -vasopressor support with levo/dopamine  -on amio gtt for recent unstable ventricular rhythm  -mgt per cardiology  - shock and pulm edema. pulm following. CRRT to begin with UF as able     #4. Acidemia- severe acidemia related to lactic acidosis/hypoperfusion. PH better on bicarb gtt and will manage with CRRT.     PLEASE FAX DAYS CERTIFIED AND NEXT REVIEW DATE

## 2018-09-06 NOTE — ED PROVIDER NOTES
Patient Seen in: [address]    History   Patient presents with:  Cardiac Arrest (cardiovascular)    Stated Complaint: arrest    HPI    She is a 47-year-old gentleman brought in for cardiac arrest.  Reports indicate that he was PANEL (14) - Abnormal; Notable for the following:     Glucose 137 (*)     Potassium 3.1 (*)     CO2 20.0 (*)     AST 55 (*)     Alt 73 (*)     All other components within normal limits   PTT, ACTIVATED - Abnormal; Notable for the following:     PTT 23.9 Bryce Stills Positioning was confirmed using auscultation and CO2 detector. Post intubation chest xray was ordered. A right intraosseous line was inserted right tibia by myself.     Patient was given multiple ACLS medications including multiple rounds of epinephrin ICD-10-CM Noted POA    * (Principal)Cardiac arrest ([facility] [address] I46.9 9/5/2018 Yes    ST elevation myocardial infarction (STEMI), unspecified artery ([facility] [address] I21.3 9/6/2018 Yes

## 2018-09-06 NOTE — PLAN OF CARE
Received pt. From cath lab at 0300. Pt. Intubated, not responsive to pain, pupils reactive. Cangrelor, levophed, amiodarone and dopamine infusing upon arrival.  Bicarb, precedex, bumex, fentanyl, and vasopressin drips initiated.   MaguiVidadon Spencer, [place],

## 2018-09-06 NOTE — PROGRESS NOTES
09/06/18 0029   Clinical Encounter Type   Visited With Family   Crisis Visit ED  (full arrest)   Referral From Nurse   Referral To    Family Spiritual Encounters   Family Coping Anxiety;Open/discussion  (on life, having hope, accepting)   Chapla

## 2018-09-06 NOTE — PLAN OF CARE
CARDIOVASCULAR - ADULT    • Maintains optimal cardiac output and hemodynamic stability Not Progressing    • Absence of cardiac arrhythmias or at baseline Not Progressing          Rec pt @ 0700, remains intubated, Dr. Juan Jiang and Dr. Emeka Berman at bedside, pt w

## 2018-09-06 NOTE — CONSULTS
[facility]    PATIENT'S NAME: Kathleen Pacheco   ATTENDING PHYSICIAN: CARRINGTON Aguilera PHYSICIAN: Scott Jeans, M.D.    PATIENT ACCOUNT#:   [de-identified]    LOCATION:  [address] [place] [address]  MEDICAL RECORD #:   [id number]       DATE OF weak but equal bilateral.   NEUROLOGIC:  Could not assess. ASSESSMENT AND PLAN:  The above clinical and physical examinations showed the following assessments:  1.      Cardiac arrest.  The patient had a presumed cardiac arrest secondary to a myocardial

## 2018-09-06 NOTE — PROCEDURES
[facility]    PATIENT'S NAME: [facility]   ATTENDING PHYSICIAN: Chris Kraft M.D. OPERATING PHYSICIAN: Abilio Mccoy M.D.    PATIENT ACCOUNT#:   [de-identified]    LOCATION:  [address] [place] [address]  MEDICAL RECORD #:   [id number]       DATE OF the venous tract, a 22-Khmer venous cannula was placed. Distal tip of this cannula was in the mid right atrium. Both cannulas were secured. The connection to the ECMO circuit will be done by Dr. Mitchell Barrera. Please see his report.   The patient's Impel

## 2018-09-06 NOTE — PROGRESS NOTES
Jen Osborne Patient Status:  Inpatient    1980 MRN [id number]   [facility] 6NE-A Attending Chivo Sparks MD   Hosp Day # 0 PCP No primary care provider on file. Critical Care Progress Note      Assessment/Plan:  1.  Acu followed by PEA arrest for which CPR was started with return of ROSC after one round. Intermittent boluses of bicarb and epinephrine given.  Case discussed with cardiology and cvs with plan to take to the OR for consideration of changing out the impella to GFRAA  83  53*   GFRNAA  72  46*   CA  8.4  6.7*   ALB  3.9  2.8*   NA  138  139   K  3.1*  3.7   CL  104  103   CO2  20.0*  13.0*   ALKPHO  92  135*   AST  55*  1,809*   ALT  73*  743*   BILT  0.8  0.7   TP  7.6  6.0*     Recent Labs   Lab  09/06/18   0

## 2018-09-06 NOTE — OPERATIVE REPORT
Cardiothoracic Surgery  Consult    Referring: Dr Liliam Carreon  Reason for consult: ECMO  34year old male developed sudden CP after playing volleyball last night  He called parents who came to get him and bring him to ED  en route, his condition worsened and he bec ECMO placement

## 2018-09-06 NOTE — CONSULTS
[facility]  Neurocritical Care       Name: Juanita Clay  MRN: [id number]  Admission Date/Time: 9/5/2018 11:02 PM  Primary Care Provider:  No primary care provider on file.         Reason for Consultation:   Cardiac Arrest    HPI: acetaminophen (TYLENOL) 160 MG/5ML oral liquid 650 mg 650 mg Oral 4 times per day   Or      acetaminophen (TYLENOL) 650 MG rectal suppository 650 mg 650 mg Rectal 4 times per day   BusPIRone HCl (BUSPAR) tab 30 mg 30 mg Oral Q8H   fentaNYL citrate (SUBLI in dextrose 5 % 250 mL infusion 0.5-30 mcg/min Intravenous Continuous   vasopressin (PITRESSIN) 20 Units in sodium chloride 0.9 % 50 mL infusion for shock 0.01-0.04 Units/min Intravenous Continuous   sodium bicarbonate 8.4 % injection      Amiodarone HCl ( placement     FINDINGS:  Endotracheal tube with tip approximate 3.8 cm above the level of the tenisha. Cardiac size is within normal limits. Interstitial and alveolar opacities bilaterally. Bibasilar opacities.   Probable small bilateral pleural effusions infarction (STEMI), unspecified artery ([facility] [address]      Plan:  Neuro:  - Neuro checks Q 1hr  - Pain Meds - as above  - On 3 pressors to maintain SBP > 90  - VEEG when able to do it. - Seizure meds if needed.   - Pupillometer for pupillary reaction Q2hrs  Cardiac

## 2018-09-06 NOTE — CONSULTS
[facility]  Report of Consultation    David Mokn Patient Status:  Inpatient    1980 MRN [id number]   [facility] 6NE-A Attending Larissa Moser MD   Hosp Day # 0 PCP No primary care provider on file.      Reason for Consu Intravenous, Q1H PRN  •  Midazolam HCl (VERSED) 2 MG/2ML injection 2 mg, 2 mg, Intravenous, Q1H PRN  •  sodium bicarbonate 150 mEq in dextrose 5 % 1,000 mL infusion, 150 mEq, Intravenous, Continuous  •  bumetanide (BUMEX) 12.5 mg in sodium chloride 0.9 % 1 chloride 0.9 % 250 mL infusion, 1 mg/min, Intravenous, Continuous **FOLLOWED BY** Amiodarone HCl (CORDARONE) 450 mg in sodium chloride 0.9 % 250 mL infusion, 0.5 mg/min, Intravenous, Continuous  •  artificial tears 83-15 % ophthalmic ointment, , Both Eyes, WBC 26.8 09/06/2018   HGB 15.7 09/06/2018   HCT 46.3 09/06/2018   .0 09/06/2018   CREATSERUM 1.80 09/06/2018   BUN 19 09/06/2018    09/06/2018   K 3.7 09/06/2018    09/06/2018   CO2 13.0 09/06/2018    09/06/2018   CA 6.7 09/06/2 bicarb gtt and will manage with CRRT. D/W family at bedside    CC time 40 minutes      Thank you for allowing me to participate in the care of your patient. Please do not hesitate to call with any questions or concerns.        Pantera Buenrostro  9/6/2018

## 2018-09-06 NOTE — RESPIRATORY THERAPY NOTE
Coded pt at 2300 for about an hour in ER. Pts SpO2 reached 100% towards end of code. Pt was then brought to Cath Lab where SpO2 was now reading in the 60's and pt color appears more purple. Vent settings were VC 18/600/100%/+5.  Increased PEEP to +10 with a

## 2018-09-06 NOTE — ANESTHESIA POSTPROCEDURE EVALUATION
[facility] Beauford Quam Patient Status:  Inpatient   Age/Gender 34year old male MRN [id number]   [facility] 6NE-A Attending Josué Reid MD   Hosp Day # 0 PCP No primary care provider on file.        Anesthesia Post-op Note

## 2018-09-06 NOTE — ANESTHESIA PREPROCEDURE EVALUATION
PRE-OP EVALUATION    Patient Name: Bryant Sibley    Pre-op Diagnosis: * No pre-op diagnosis entered *    * No procedures listed *    * No surgeons found in log *    Pre-op vitals reviewed.   Pulse: 112  Resp: 21  BP: 107/86  FiO2 (%): 100 %  SpO2: 9 [COMPLETED] Amiodarone HCl (CORDARONE) 900 mg in dextrose 5 % 250 mL, sodium chloride 0.9 % 250 mL infusion  Intravenous Code/Trauma Continuous Med   [COMPLETED] DOPamine in D5W (INTROPIN) 800 mg/250 ml premix infusion  Intravenous Code/Trauma Continuous time of case                  Present on Admission:  • Cardiac arrest [facility])  • ST elevation myocardial infarction (STEMI), unspecified artery ([facility] [address]

## 2018-09-06 NOTE — PROCEDURES
[address]    PATIENT'S NAME: Kathleen Pacheco   ATTENDING PHYSICIAN: Everett De La Rosa M.D. OPERATING PHYSICIAN: Scott Jeans, M.D.    PATIENT ACCOUNT#:   [de-identified]    LOCATION:  [address] [place] [address]  MEDICAL RECORD #:   [id number]       DATE OF B and waveforms were confirmed as appropriate. Subsequent to Impella placement, intervention proceeded. The patient had received 10,000 units of heparin and was given a secondary 2000 unit bolus, with an ACT confirmed at greater than 250 seconds.   The davide recommendations are dependent upon involvement of critical care. Prognosis is unclear in this case as the patient's down time with circulatory compromise was unclear but estimated to be 12 to 24 minutes.   Further recommendations are dependent upon managem

## 2018-09-06 NOTE — PROGRESS NOTES
[facility]  Cardiology Critical Care Note    Dakotah Moore Patient Status:  Inpatient    1980 MRN [id number]   [facility] 6NE-A Attending Shaina Chamberlain MD   Hosp Day # 0 PCP No primary care provider on file. TROP  <0.046       Allergies:     Demerol Hcl [Meperi*    ITCHING    Medications:    Current Facility-Administered Medications:  ondansetron HCl (ZOFRAN) injection 4 mg 4 mg Intravenous Q6H PRN   Metoclopramide HCl (REGLAN) injection 10 mg 10 mg Intraven mg Rectal Daily PRN   FLEET ENEMA (FLEET) 7-19 GM/118ML enema 133 mL 1 enema Rectal Once PRN   Chlorhexidine Gluconate (PERIDEX) 0.12 % solution 15 mL 15 mL Mouth/Throat Birgit@[email or web address]   Midazolam HCl (VERSED) 2 MG/2ML injection 2 mg 2 mg Intravenous Q5 Min ETOH level in blood 30 mg/dL, otherwise negative urine drug screen 7    Wide complex tachycardia noted and than went into PEA arrest.  Given IV amiodarone 150 mg x1 and increased IV amiodarone drip 1mg/min from 0.5 mg/min.   During code situation started on

## 2018-09-07 NOTE — PROGRESS NOTES
[address] Patient Status:  Inpatient    1980 MRN [id number]   [facility] 6NE-A Attending Kodak Doe MD   Hosp Day # 1 PCP No primary care provider on file.      Critical Care Progress Note     Date of MG/ML injection 50 mcg, 50 mcg, Intravenous, Q30 Min PRN  •  acetaminophen (TYLENOL) tab 650 mg, 650 mg, Oral, Q6H PRN **OR** acetaminophen (TYLENOL) 160 MG/5ML oral liquid 650 mg, 650 mg, Oral, Q6H PRN **OR** acetaminophen (TYLENOL) 650 MG rectal supposit Intravenous, Once  •  EPINEPHrine HCl (ADRENALIN) 16 mg in sodium chloride 0.9 % 250 mL infusion, 1-10 mcg/min, Intravenous, Continuous  •  heparin sodium 1000 UNIT/ML injection 1,500 Units, 1.5 mL, Intravenous, PRN  •  PRISMASOL BGK 4/2.5 CRRT fluid 5000m place                          Lungs: Clear to auscultation                           Chest wall: No tenderness or deformity.                         GQQGU: GOMWXJS rate and rhythm                          Abdomen: soft, mildly distended, hypoactive BS. displayed.      Recent Labs   Lab  09/07/18   0738   ABGPHT  7.26*   ZNQTOM7S  44   ACIQG5J  393*   ABGHCO3  19.9*   ABGBE  -7.1   TEMP  95.7   JANET  Not Applicable   SITE  Arterial Line   DEV  Servo Adult   THGB  8.2*       Imaging: I have independently v

## 2018-09-07 NOTE — PROGRESS NOTES
[facility]  Nephrology Progress Note    Leonard [facility] Patient Status:  Inpatient    1980 MRN [id number]   [facility] 6NE-A Attending Hardeep Meléndez MD   Hosp Day # 1 PCP No primary care provider on file.        SUBJECTIVE: 09/07/18   0400   ALT  73*  743*  792*  715*   AST  55*  1,809*  2,653*  2,330*   ALB  3.9  2.8*  3.0*  3.0*       Recent Labs   Lab  09/07/18   0607  09/07/18   0703  09/07/18   0807  09/07/18   0914  09/07/18   1001   PGLU  109*  108*  110*  124*  111* 100 mg Oral BID   PEG 3350 (MIRALAX) powder packet 17 g 17 g Oral Daily PRN   magnesium hydroxide (MILK OF MAGNESIA) 400 MG/5ML suspension 30 mL 30 mL Oral Daily PRN   bisacodyl (DULCOLAX) rectal suppository 10 mg 10 mg Rectal Daily PRN   FLEET ENEMA (FLEE Glucose-Vitamin C (DEX-4) 4-0.006 g chewable tab 8 tablet 8 tablet Oral Q15 Min PRN   Insulin Regular Human (NOVOLIN R) 250 Units in sodium chloride 0.9 % 250 mL infusion 1-57 Units/hr Intravenous Continuous   Albumin Human (ALBUMINAR) 5 % solution 250 m

## 2018-09-07 NOTE — OCCUPATIONAL THERAPY NOTE
OT order received. Pt is on the vent and not medically appropriate for OT services at this time. Will sign off the case and new order can be placed if appropriate. RN in agreement with this plan.

## 2018-09-07 NOTE — DIETARY NOTE
Nutrition Short Note    Dietitian consult received per cardiac rehab/CHF protocol. Pt to be educated by cardiac rehab staff and encouraged to attend outpatient classes taught by RD. RD available PRN.     Nikole Almendarez MS, RD, LDN

## 2018-09-07 NOTE — PLAN OF CARE
Assumed care of patient at [address] [place] [address]. Patient intubated, sedated, and paralyzed on fentanyl, ativan, and nimbex. Pupils equal and reactive via pupillometer. Unable to perform complete neurologically assessment due to paralytic. BIS monitoring ongoing.  TOF onanisai

## 2018-09-07 NOTE — CM/SW NOTE
09/07/18 1100   CM/SW Referral Data   Referral Source Social Work (self-referral)   Reason for Referral Psychoscial assessment   Informant (Mother)     SW met with pt's mother outside of patient's room.   Pt is 33 yo male, admitted due to cardiac arrest.

## 2018-09-07 NOTE — CONSULTS
[facility]  Report of Consultation    Sharon Cowden Patient Status:  Inpatient    1980 MRN [id number]   [facility] 6NE-A Attending Tamra Oconnor MD   Hosp Day # 1 PCP No primary care provider on file.      Reason for Consu **OR** acetaminophen (TYLENOL) 650 MG rectal suppository 650 mg, 650 mg, Rectal, 4 times per day  •  BusPIRone HCl (BUSPAR) tab 30 mg, 30 mg, Oral, Q8H  •  fentaNYL citrate (SUBLIMAZE) 0.05 MG/ML injection 25-50 mcg, 25-50 mcg, Intravenous, Q1H PRN  •  Mep sodium chloride 0.9 % 50 mL infusion for shock, 0.01-0.04 Units/min, Intravenous, Continuous  •  [] Amiodarone HCl (CORDARONE) 450 mg in sodium chloride 0.9 % 250 mL infusion, 1 mg/min, Intravenous, Continuous **FOLLOWED BY** Amiodarone HCl (CORDARO dextrose 5 % 250 mL infusion, 0.5-30 mcg/min, Intravenous, Continuous  •  heparin (PORCINE) drip 74163ffhhc/250mL infusion, 800 Units/hr, Intravenous, Continuous    Physical Exam:  Wt Readings from Last 6 Encounters:  09/07/18 : 226 lb 6.6 oz (102.7 kg) ([facility] [address]     Oliguria     SANCHO (acute kidney injury) ([facility] [address]     Metabolic acidosis     Acute respiratory failure with hypoxia (HCC)     Cardiogenic shock (HCC)     ATN (acute tubular necrosis) (HCC)      Assessment and Plan:   34year old male here for      1.

## 2018-09-07 NOTE — PROGRESS NOTES
[facility]  Progress Note     Bryant Sibley Patient Status:  Inpatient    1980 MRN [id number]   [facility] 6NE-A Attending Alicia Palacios MD   Hosp Day # 1 PCP No primary care provider on file.      Chief Complaint: intu SCr of 2.6 mg/dL (H)). Recent Labs   Lab  09/05/18 2312  09/07/18   0400   PTP  13.2  16.6*   INR  0.96  1.29*       Recent Labs   Lab  09/05/18 2312 09/06/18   1452   TROP  <0.046  >200.000*            Imaging: Imaging data reviewed in Epic.     Me unresponsive  5. Cardiogenic shock  1. Pressors, amio  2. Impella, ECMO, heparin drip   3. echo's reviewed  6. NSVT  1. Monitor tele, replaced lytes, amiodarone  7. Ischemic cardiomyopathy, EF 20-25%  8. ATN with oliguria 2/2 #1  1. CRRT per nephrology  9.

## 2018-09-07 NOTE — PROGRESS NOTES
[facility]  Neurocritical Care       Subjective: Lexie Peñaloza is a(n) 34year old male s/p cardiac arrest s/p PTCA stent on ECMO, Ativan drip and Nimbex.     Review of Systems    Unable to assess    Vitals:   Blood pressure 99/79 radiograph was obtained. COMPARISON:  [facility] , XR CHEST AP PORTABLE  (CPT=71045), 9/06/2018, 4:00. INDICATIONS:  Code  PATIENT STATED HISTORY: (As transcribed by Technologist)  Code    FINDINGS:  Endotracheal and enteric tubes are again noted.   Patella d 09/07/2018   HGB 9.9 09/07/2018   HCT 28.5 09/07/2018   .0 09/07/2018   CREATSERUM 2.59 09/07/2018   BUN 20 09/07/2018    09/07/2018   K 4.3 09/07/2018    09/07/2018   CO2 21.0 09/07/2018    09/07/2018   CA 6.3 09/07/2018   ALB 3. mg 4 mg Intravenous Q6H PRN   Metoclopramide HCl (REGLAN) injection 10 mg 10 mg Intravenous Q8H PRN   cangrelor tetrasodium (KENGREAL) 50 mg in sodium chloride 0.9 % 250 mL IVPB for BRIDGING 0.75 mcg/kg/min Intravenous Continuous   acetaminophen (TYLENOL) Intravenous Q5 Min PRN   Dexmedetomidine HCl in NaCl (PRECEDEX) 400 MCG/100ML premix infusion 0.2-1.5 mcg/kg/hr Intravenous Continuous   famoTIDine (PEPCID) injection 20 mg 20 mg Intravenous BID   vasopressin (PITRESSIN) 20 Units in sodium chloride 0.9 % 5 0.5-30 mcg/min Intravenous Continuous   heparin (PORCINE) drip 56614lywkn/250mL infusion 800 Units/hr Intravenous Continuous       Assesment/Plan:   Principal Problem:    Cardiac arrest [facility])  Active Problems:    ST elevation myocardial infarction (STEMI), manage and/or prevent neurologic instability. This involved direct patient intervention, complex decision making, and/or extensive discussions with the patient, family, and clinical staff.     Thank you for allowing me to participate in the care of this pat

## 2018-09-07 NOTE — PROGRESS NOTES
[facility]  Progress Note    Ryan Jean Baptiste Patient Status:  Inpatient    1980 MRN [id number]   [facility] 6NE-A Attending Zuri Wray MD   Hosp Day # 1 PCP No primary care provider on file.        Assessment:    · CAD wit 09/06/2018       Medications:    • calcium gluconate  3 g Intravenous Once   • acetaminophen  650 mg Oral 4 times per day    Or   • acetaminophen  650 mg Rectal 4 times per day   • BusPIRone HCl  30 mg Oral Q8H   • docusate sodium  100 mg Oral BID   • Chlo

## 2018-09-07 NOTE — CONSULTS
[address]    PATIENT'S NAME: Percy Perezoms   ATTENDING PHYSICIAN: CARRINGTON Wakefield PHYSICIAN: Sarah Rich M.D.    PATIENT ACCOUNT#:   [de-identified]    LOCATION:  [address] [place] [address]  MEDICAL RECORD #:   [id number]       DATE OF BIR tell if there was a systolic ejection murmur. ABDOMEN:  His abdomen is soft. EXTREMITIES:  Difficult to hear Doppler flow in the lower legs.     NEUROLOGIC:  The patient is unable to have any type of neurological evaluation due to sedation, but  notes vasopressors with need of continued support from these devices, and Dr. Shakila Silva is especially concerned that he may not tolerate that type of an operation. We will continue with the anticoagulation and anti-platelet medications.   His last hemoglobin was 9

## 2018-09-07 NOTE — CONSULTS
INFECTIOUS DISEASE CONSULT NOTE    William Pardeep [facility] Patient Status:  Inpatient    1980 MRN [id number]   [facility] 6NE-A Attending Aristeo Isaac MD   Hosp Day # 1 PCP No prim Q15 Min PRN  •  Insulin Regular Human (NOVOLIN R) 250 Units in sodium chloride 0.9 % 250 mL infusion, 1-57 Units/hr, Intravenous, Continuous  •  ondansetron HCl (ZOFRAN) injection 4 mg, 4 mg, Intravenous, Q6H PRN  •  Metoclopramide HCl (REGLAN) injection 1 MAGNESIA) 400 MG/5ML suspension 30 mL, 30 mL, Oral, Daily PRN  •  bisacodyl (DULCOLAX) rectal suppository 10 mg, 10 mg, Rectal, Daily PRN  •  FLEET ENEMA (FLEET) 7-19 GM/118ML enema 133 mL, 1 enema, Rectal, Once PRN  •  Chlorhexidine Gluconate (PERIDEX) 0. Albumin Human (ALBUMINAR) 5 % solution 250 mL, 250 mL, Intravenous, PRN Dialysis  •  Norepinephrine Bitartrate (LEVOPHED) 16 mg in dextrose 5 % 250 mL infusion, 0.5-30 mcg/min, Intravenous, Continuous  •  heparin (PORCINE) drip 84433xnhql/250mL infusion, 8 21.0*  21.0*   ALKPHO  135*   --   61  56   --    AST  1,809*   --   2,653*  2,330*   --    ALT  743*   --   792*  715*   --    BILT  0.7   --   1.4  1.6   --    TP  6.0*   --   5.3*  5.2*   --     < > = values in this interval not displayed.      No result artery. Cardiac size is unchanged. No pneumothorax. Diffuse interstitial and alveolar opacities bilaterally. No pleural effusions. CONCLUSION:  Slight worsening of diffuse interstitial and alveolar opacities likely representing edema.      Dictated by MI resulting in cardiogenic shock with MSOF  -monitor off abx for now  -pan culture if any fevers or if pt decompensates/ worsens.  At risk for infection with multiple IVs/ devices    Case and plan d/w RN    Thank you for allowing me to participate in th

## 2018-09-07 NOTE — RESPIRATORY THERAPY NOTE
Arterial line placed per MD. placed  right radial, threaded with no resistance  Good wave form and drawback from line.

## 2018-09-07 NOTE — PHYSICAL THERAPY NOTE
Order received for PT eval per [facility] protocol. Pt not medically appropriate for therapy at this time. Discussed with RN and will place patient ON HOLD. Please re-order therapy, as appropriate.

## 2018-09-08 NOTE — PROGRESS NOTES
Mark Jimenez [facility] Patient Status:  Inpatient    1980 MRN [id number]   [facility] 6NE-A Attending Melia Schulz MD   Hosp Day # 2 PCP No primary care provider on file. Critical Care Progress Note      Assessment/Plan:  1.  Acut the left UE overnight, required emergent fasciotomy.     Objective:    Medications:  • ceFAZolin  2 g Intravenous Q8H   • sodium chloride   Intravenous Once   • acetaminophen  650 mg Oral 4 times per day    Or   • acetaminophen  650 mg Rectal 4 times per da 6. 3*  6.4*   < >  6.8*  6.3*  5.9*   ALB  3.0*  3.0*   --    --   2.5*   --    NA  146*  146*  142  143   < >  139  139  139   K  3.2*  3.2*  4.0  4.0   < >  4.9  5.4*  5.1   CL  111  111  108  109   < >  108  108  110   CO2  20.0*  20.0*  21.0*  21.0*

## 2018-09-08 NOTE — PROGRESS NOTES
09/08/18 1003   Clinical Encounter Type   Visited With Family  (Brother)   Routine Visit Follow-up   Continue Visiting Yes   Crisis Visit Critical care   Patient Spiritual Encounters   Spiritual Assessment Completed No   Spiritual Interventions (Chaplai

## 2018-09-08 NOTE — OPERATIVE REPORT
[facility]    PATIENT'S NAME: Anyi Caller   ATTENDING PHYSICIAN: Ottoniel Flores M.D. OPERATING PHYSICIAN: Ceci Flores M.D.    PATIENT ACCOUNT#:   [de-identified]    [facility]  MEDICAL RECORD #:   [id number]       DATE OF B discussed and reviewed with Dr. Santino Park, who believes that the arm should be done.   The patient, at the same time, had ischemia to his lower legs, partly because of the flow and a lumen obstruction from the ECMO catheter in the right femoral artery and the or move the ECMO and the Impella device. His left arm was placed out on a table next to the bed stabilizing the arm. His left arm was prepped and draped in the usual sterile technique. The patient received preoperative antibiotics.   He would be continue still remained about the same, with a mean arterial pressure between 60 to 65. His oxygenation remained about the same. The patient, once this was all done with hemostasis obtained, then had the wounds packed with saline-soaked gauze.   There was a portio

## 2018-09-08 NOTE — PROGRESS NOTES
[facility]  Progress Note    Talya Brambila Patient Status:  Inpatient    1980 MRN [id number]   [facility] 6NE-A Attending Melia Schulz MD   Hosp Day # 2 PCP No primary care provider on file.      Assessment:  #Cardiogenic

## 2018-09-08 NOTE — PROGRESS NOTES
Case reviewed extensively through the day. Discussed with Dr. Kailyn Leach, Jamir Bhandari, Melia Little. Essentially no improvement on ECMOand Impella. Doppler flow to tibials. Has compartment syndrome that Dr. Jacobo Powell will address on left arm.  EF 20% from AMI with

## 2018-09-08 NOTE — PROGRESS NOTES
[facility]  Nephrology Progress Note    Elina Zhou [facility] Patient Status:  Inpatient    1980 MRN [id number]   [facility] 6NE-A Attending Lola Dubon MD   Hosp Day # 2 PCP No primary care provider on file.        SUBJECTIVE: [MAR Hold] Midazolam HCl (VERSED) 2 MG/2ML injection 2 mg 2 mg Intravenous Q1H PRN   sodium bicarbonate 150 mEq in dextrose 5 % 1,000 mL infusion 150 mEq Intravenous Continuous   bumetanide (BUMEX) 12.5 mg in sodium chloride 0.9 % 100 mL infusion 0.5 mg/ ointment  Both Eyes BID   Cisatracurium Besylate (NIMBEX) 200 mg in sodium chloride 0.9 % 200 mL infusion 0.5-10 mcg/kg/min Intravenous Continuous   phenylephrine HCl (SHANNON-SYNEPHRINE) 250 mg in sodium chloride 0.9 % 250 mL infusion 100-200 mcg/min Intraven 09/07/18   1743  09/07/18 2009 09/08/18   0007   WBC  10.4  26.8*   --   18.0*  13.8*   --   14.5*  16.1*   HGB  15.8  15.7  10.9*  9.9*  9.0*   --   8.2*  7.1*   MCV  89.8  91.5   --   88.5  88.8   --   88.5  88.6   PLT  314.0  411.0   --   161.0  99.0

## 2018-09-08 NOTE — ANESTHESIA POSTPROCEDURE EVALUATION
[facility] Harlan Rede Patient Status:  Inpatient   Age/Gender 34year old male MRN [id number]   [facility] 6NE-A Attending Juliano Joyner, [facility] Se Day # 2 PCP No primary care provider on file.        Anesthesia Post-op Note

## 2018-09-08 NOTE — PROGRESS NOTES
[facility]                INFECTIOUS DISEASE PROGRESS NOTE    Kamlajayda Chilel Patient Status:  Inpatient    1980 MRN [id number]   [facility] 6NE-A Attending Hammad Crandall MD   Hosp Day # 2 PCP No primary care provider o 41*   --    --    AST  2,653*  2,330*   --   619*   --    --    ALT  792*  715*   --   298*   --    --    BILT  1.4  1.6   --   1.1   --    --    TP  5.3*  5.2*   --   4.1*   --    --     < > = values in this interval not displayed.        Problem list revi

## 2018-09-08 NOTE — PROGRESS NOTES
09/08/18 0701   Clinical Encounter Type   Visited With Patient and family together   Routine Visit Introduction   Continue Visiting No  (Family will request ministry, as needed.)   Crisis Visit ED;Critical care   Patient's Supportive Strategies/Resource

## 2018-09-08 NOTE — PLAN OF CARE
CARDIOVASCULAR - ADULT    • Maintains optimal cardiac output and hemodynamic stability Progressing    • Absence of cardiac arrhythmias or at baseline Progressing        HEMATOLOGIC - ADULT    • Maintains hematologic stability Progressing    • Free from ble doppler, Cardiology is aware. Insulin gtt infusing and titrated per post-open heart algorithm. [place]/cordis in place and stable through right IJ. Left hand/lower arm dusky/mottled and cool, MDs aware.  Plan of care discussed and emotional support is provided

## 2018-09-08 NOTE — PROGRESS NOTES
[facility]  Progress Note     Leeanna Georginaon Patient Status:  Inpatient    1980 MRN [id number]   [facility] 6NE-A Attending Ash Wood MD   Hosp Day # 2 PCP No primary care provider on file.      Chief Complaint: Ciro Tejeda 146*  142  143   < >  139  139  139   K  3.2*  3.2*  4.0  4.0   < >  5.4*  5.1  5.3*   CL  111  111  108  109   < >  108  110  110   CO2  20.0*  20.0*  21.0*  21.0*   < >  21.0*  21.0*  21.0*   ALKPHO  61  56   --   41*   --    --    AST  2,653*  2,330* regurgitation. Impressions:  Limited evaluation earlier, does not allow for comparison.   *    ----------------------------------------------------------------------------  *      ASSESSMENT / PLAN:     1. VF arrest d/t STEMI sp PCI to LAD with cardiogen

## 2018-09-08 NOTE — ANESTHESIA PREPROCEDURE EVALUATION
PRE-OP EVALUATION    Patient Name: William Roberto [facility]    Pre-op Diagnosis: cold left upper extremity    Procedure(s):      Surgeon(s) and Role:     Barbara Marcial MD - Primary    Pre-op vitals reviewed.   Temp: 95.5 °F (35.3 °C)  Pulse: 86  Resp: 16  B UNIT/ML injection      [COMPLETED] cangrelor tetrasodium (KENGREAL) 50 MG injection SOLR      [COMPLETED] Sterile Water for Injection injection      [COMPLETED] iodixanol (VISIPAQUE) 320 MG/ML injection 100 mL 100 mL Injection ONCE PRN   [COMPLETED] Norepi PRN   Or      [MAR Hold] acetaminophen (TYLENOL) 650 MG rectal suppository 650 mg 650 mg Rectal Q6H PRN   fentaNYL citrate (SUBLIMAZE) 1,000 mcg in sodium chloride 0.9 % 100 mL infusion  mcg/hr Intravenous Continuous PRN   [MAR Hold] docusate sodium 100-200 mcg/min Intravenous Continuous   [MAR Hold] Amiodarone HCl (CORDARONE) 150 mg in dextrose 5 % 100 mL bolus 150 mg Intravenous Once   [] phenylephrine in NaCl (SHANNON-SYNEPHRINE) 50 mg/250 ml premix infusion SOLN      EPINEPHrine HCl (ADRENALIN) (ADRENALIN) 1 MG/10ML injection (CARDIAC ARREST)  Intravenous Code/Trauma Med   [COMPLETED] Amiodarone HCl (CORDARONE) 150 MG/3ML injection  Intravenous Code/Trauma Med   [COMPLETED] Naloxone HCl (NARCAN) 0.4 MG/ML injection  Intravenous Code/Trauma Med Alcohol use: Not on file      Drug use: Not on file     Available pre-op labs reviewed.   Lab Results   Component Value Date    WBC 14.5 (H) 09/07/2018    RBC 2.62 (L) 09/07/2018    HGB 8.2 (L) 09/07/2018    HCT 23.2 (L) 09/07/2018    MCV 88.5 09/07/2018

## 2018-09-08 NOTE — OPERATIVE REPORT
Pre-operative diagnosis: Ischemic left arm/compartment syndrome. Post-operative diagnosis: same. Procedure; Left arm fasciotomy/repair left brachial artery. Surgeon: Ashu Chamberlain, Asst: Irene Jackson. EBL-150cc.

## 2018-09-08 NOTE — PROGRESS NOTES
[facility]  Neurocritical Care       Subjective: Talya Brambila is a(n) 34year old male s/p cardiac arrest s/p PTCA stent on ECMO, Ativan drip and Nimbex.   9/8/2018 - Emergent fasciotomy in the left arm, loosing blood getting rep Approved by: Kelley Hutton MD            Xr Chest Ap Portable  (cpt=71045)    Result Date: 9/6/2018  PROCEDURE:  XR CHEST AP PORTABLE  (CPT=71045)  TECHNIQUE:  AP chest radiograph was obtained.   COMPARISON:  [facility] XR CHEST AP PORTABLE  (CPT=7104 discrepancy.      Dictated by: Malachi Holstein, MD on 9/06/2018 at 6:47     Approved by: Malachi Holstein, MD              Lab Review     Lab Results   Component Value Date    WBC 13.4 09/08/2018    HGB 6.9 09/08/2018    HCT 19.3 09/08/2018    PLT 41.0 09/08 this interval not displayed.        Medications:       Current Facility-Administered Medications:  dextrose 5 %-0.45 % NaCl infusion  Intravenous Continuous   acetaminophen (TYLENOL) tab 650 mg 650 mg Oral Q6H PRN   ondansetron HCl (ZOFRAN) injection 4 mg 4 0.05 MG/ML injection 25 mcg 25 mcg Intravenous Q30 Min PRN   Or      fentaNYL citrate (SUBLIMAZE) 0.05 MG/ML injection 50 mcg 50 mcg Intravenous Q30 Min PRN   acetaminophen (TYLENOL) tab 650 mg 650 mg Oral Q6H PRN   Or      acetaminophen (TYLENOL) 160 MG/5 Continuous   heparin sodium 1000 UNIT/ML injection 1,500 Units 1.5 mL Intravenous PRN   heparin sodium 25,000 Units in dextrose 5 % 500 mL IMPELLA FLUID 3-30 mL/hr Intravenous Continuous   Albumin Human (ALBUMINAR) 5 % solution 250 mL 250 mL Intravenous [place] RASS -5  D: CAM ICU -  E: PT/OT when appropriate  F: Spoke with family at bedside and gave them an update.    G: [place] yes, [place] yes     Goals of the Day: VEEG when we are able to do it, Pupillometer Q2hrs    A total of 35 minutes of critical care

## 2018-09-08 NOTE — PLAN OF CARE
0030 increased pallor observed in the left hand/fingers. Fingers are cold. Hand/fingers are dusky and mottled. Left arm is extremely stiff and rigid. Neuro APN at the bedside during this assessment. Absent brachial pulse via doppler.  Dr. Jeff Dunbar pagelivier and no

## 2018-09-08 NOTE — CONSULTS
Thank you for this referral, [address] [place] [address] will continue to follow this patient for organ/tissue/eye. Please do not mention donation to the family. If family brings up donation or w/d of care please call [address] [place] [address].  If you have any questions or concerns, please call us as

## 2018-09-08 NOTE — CONSULTS
[facility]    PATIENT'S NAME: Hazel Marte   ATTENDING PHYSICIAN: CARRINGTON Quach: Aida Kirkland M.D.    PATIENT ACCOUNT#:   [de-identified]    LOCATION:  [address] [place] [address]  MEDICAL RECORD #:   [id number]       DATE OF removing the right femoral artery sheath, and then inserting an ECMO arterial inflow device into the site of the Dacron graft suture onto the artery and, hopefully, let a little bit more blood flow get into the right leg; however, there was concern by Delvin Tellez have significant bleeding during the procedure, as well as postoperatively, still concern of losing both legs, sepsis, with persistent neurological damage with anoxic encephalopathy and possible noncognitive life afterwards, the family wishes to proceed.

## 2018-09-08 NOTE — PROGRESS NOTES
[facility]  Progress Note    Lysbeth Body Patient Status:  Inpatient    1980 MRN [id number]   [facility] 6NE-A Attending Henrique Dexter MD   Hosp Day # 2 PCP No primary care provider on file.        SUBJECTIVE:  Underwent L 9855  09/07/18   0513  09/07/18   2756  09/07/18   0703  09/07/18   0807  09/07/18   0914  09/07/18   1001  09/07/18   1108  09/07/18   1152  09/07/18   1252  09/07/18   1358  09/07/18   1457  09/07/18   1605  09/07/18   1708  09/07/18   1742  09/07/18   1 Intravenous Continuous   [MAR Hold] acetaminophen (TYLENOL) 160 MG/5ML oral liquid 650 mg 650 mg Oral 4 times per day   Or      [MAR Hold] acetaminophen (TYLENOL) 650 MG rectal suppository 650 mg 650 mg Rectal 4 times per day   [MAR Hold] BusPIRone HCl (BU PRN   Dexmedetomidine HCl in NaCl (PRECEDEX) 400 MCG/100ML premix infusion 0.2-1.5 mcg/kg/hr Intravenous Continuous   [MAR Hold] famoTIDine (PEPCID) injection 20 mg 20 mg Intravenous BID   vasopressin (PITRESSIN) 20 Units in sodium chloride 0.9 % 50 mL inf requirement   - hypoglycemia management prn per protocol      2. CAD s/p Cardiac arrest - hypothermia, stents placed, impella  3. Vfib - amio gtt, cardiology   4. Acute respiratory failure - ventilation per pulmonary   5.  Cardiogenic shock - pressors, impe

## 2018-09-08 NOTE — PROGRESS NOTES
Assumed care for this patient at 0730. Patient sedated, paralyzed and intubated. Continuous BIS and cerebral ox monitoring in place. CP Impella on power 3, flows 1.2-1.7. ECMO continued flows 3.6-3.7 speeds consistently 3520.  CRRT flowing but UF rate is no

## 2018-09-08 NOTE — PROGRESS NOTES
Discussed with family his surgery. His lower leg circulation/ concern for bilateral leg amputation/ his multisystem organ failure with renal.hepatic. Respiratory, cardiac failure.  Per cardiology

## 2018-09-09 NOTE — PROGRESS NOTES
09/09/18 0253   Clinical Encounter Type   Visited With Family   Routine Visit (Death)   Continue Visiting No   Crisis Visit Death   Patient's Supportive Strategies/Resources  provided loving and calming presence as the family expressed their gri

## 2018-09-09 NOTE — DISCHARGE SUMMARY
[facility]  DISCHARGE SUMMARY     Christina Dumont Patient Status:  Inpatient    1980 MRN [id number]   [facility] 6NE-A Attending No att. providers found   Hosp Day # 3 PCP No primary care provider on file.      Date of Admis placement of Impella and admission to CCU on multiple vasopressors d/t cardiogenic shock. Patient with SANCHO > nephrology consulted and CRRT initiated. Patient unable to maintain hemodynamics, CVS consulted and ECMO initiated.  Patient with PEA arrest, resusc

## 2018-09-09 NOTE — PLAN OF CARE
Assumed care of this patient at [address] [place] [address]. Received patient intubated and sedated via Propofol and Fentanyl gtt's. Patient is paralyzed via Nimbex, managing and titrating via train of four assessments. Pupils equal and and reactive via pupillometer.  Unable to p Decrease in MAP (55-59). PRN albumin administered and PRBC infusion rate increased. Impella level reduced to level P2. Minor relief in suctioning events.    18- Dr. Shwetha Babb notified of increased impella suction alarms, orders to administer 500 cc Albumin r

## 2018-09-09 NOTE — PROGRESS NOTES
09/08/18 0864   Clinical Encounter Type   Visited With Family  (Sister and friend at bedside)   Continue Visiting No  (Family will request the  for further ministry)   Surgical Visit Post-op   Crisis Visit Critical care   Patient's Supportive St

## 2018-09-10 LAB
BLOOD TYPE BARCODE: 5100
BLOOD TYPE BARCODE: 5100
BLOOD TYPE BARCODE: 6200
GLUCOSE BLD-MCNC: 104 MG/DL (ref 70–99)
GLUCOSE BLD-MCNC: 111 MG/DL (ref 70–99)
ISTAT ACTIVATED CLOTTING TIME: 191 SECONDS (ref 74–137)
ISTAT ACTIVATED CLOTTING TIME: 202 SECONDS (ref 74–137)
ISTAT BLOOD GAS BASE DEFICIT: -1 MMOL/L
ISTAT BLOOD GAS BASE DEFICIT: -3 MMOL/L
ISTAT BLOOD GAS BASE DEFICIT: -4 MMOL/L
ISTAT BLOOD GAS HCO3: 22 MEQ/L (ref 22–26)
ISTAT BLOOD GAS HCO3: 23.9 MEQ/L (ref 22–26)
ISTAT BLOOD GAS HCO3: 24.3 MEQ/L (ref 22–26)
ISTAT BLOOD GAS O2 SATURATION: 100 % (ref 92–100)
ISTAT BLOOD GAS O2 SATURATION: 100 % (ref 92–100)
ISTAT BLOOD GAS O2 SATURATION: 99 % (ref 92–100)
ISTAT BLOOD GAS PCO2: 35 MMHG (ref 35–45)
ISTAT BLOOD GAS PCO2: 42 MMHG (ref 35–45)
ISTAT BLOOD GAS PCO2: 52 MMHG (ref 35–45)
ISTAT BLOOD GAS PH: 7.26 (ref 7.35–7.45)
ISTAT BLOOD GAS PH: 7.37 (ref 7.35–7.45)
ISTAT BLOOD GAS PH: 7.41 (ref 7.35–7.45)
ISTAT BLOOD GAS PO2: 115 MMHG (ref 80–105)
ISTAT BLOOD GAS PO2: 392 MMHG (ref 80–105)
ISTAT BLOOD GAS PO2: >430 MMHG (ref 80–105)
ISTAT BLOOD GAS TCO2: 23 MMOL/L (ref 22–32)
ISTAT BLOOD GAS TCO2: 26 MMOL/L (ref 22–32)
ISTAT BLOOD GAS TCO2: 26 MMOL/L (ref 22–32)
ISTAT HEMATOCRIT: 18 % (ref 37–53)
ISTAT HEMATOCRIT: 21 % (ref 37–53)
ISTAT HEMATOCRIT: 25 % (ref 37–53)
ISTAT IONIZED CALCIUM: 0.91 MMOL/L (ref 1.12–1.32)
ISTAT IONIZED CALCIUM: 0.94 MMOL/L (ref 1.12–1.32)
ISTAT IONIZED CALCIUM: 0.95 MMOL/L (ref 1.12–1.32)
ISTAT PATIENT TEMPERATURE: 35 DEGREE
ISTAT POTASSIUM: 4.1 MMOL/L (ref 3.6–5.1)
ISTAT POTASSIUM: 5 MMOL/L (ref 3.6–5.1)
ISTAT POTASSIUM: 5 MMOL/L (ref 3.6–5.1)
ISTAT SODIUM: 136 MMOL/L (ref 136–144)
ISTAT SODIUM: 137 MMOL/L (ref 136–144)
ISTAT SODIUM: 142 MMOL/L (ref 136–144)
PTT 1:1 MIX 0 HOUR: 36.6 SECOND (ref 26.1–34.6)
PTT 1:1 MIX 1 HOUR: 36.4 SECOND (ref 26.1–34.6)
PTT NEW 1:1 MIX 1 HOUR: 36.7 SECOND (ref 26.1–34.6)
PTT ZERO HOUR: 242.9 SECOND (ref 26.1–34.6)
PTTHEPZ ZERO HOUR: 42.7 SECOND (ref 26.1–34.6)

## 2018-10-15 ENCOUNTER — TELEPHONE (OUTPATIENT)
Dept: INTERNAL MEDICINE CLINIC | Facility: CLINIC | Age: 38
End: 2018-10-15

## 2018-10-15 NOTE — TELEPHONE ENCOUNTER
Incoming (mail or fax):  mail  Received from:  [employer]  Documentation given to:  Documents given to Daria Sousa

## 2018-10-16 NOTE — TELEPHONE ENCOUNTER
Medical records request rec'd from Vanita Michael for records dated 7-30-16 to present. Forwarded to [employer] 10-16-18.   See med rec scan dated 10-16-18

## 2018-10-29 NOTE — TELEPHONE ENCOUNTER
Incoming (mail or fax):   Fax  Received from:  [employer]  Documentation given to:  Gave documentation to Louis Armendariz

## 2018-12-06 NOTE — TELEPHONE ENCOUNTER
Record  Request from Vanita Michael for records dated 7-30-16 to present. This is addressed to Aidan  not [employer] so I am forwarding it to [employer] 12-6-18.   See med rec scan dated 12-6-18

## (undated) DEVICE — GAUZE SPONGES,12 PLY: Brand: CURITY

## (undated) DEVICE — TOWEL: OR BLU 80/CS: Brand: MEDICAL ACTION INDUSTRIES

## (undated) DEVICE — SUTURE PROLENE 7-0 CC

## (undated) DEVICE — BANDAGE ROLL,100% COTTON, 6 PLY, LARGE: Brand: KERLIX

## (undated) DEVICE — SUTURE PROLENE 6-0 C-1

## (undated) DEVICE — SUTURE VICRYL 2-0 CT-1

## (undated) DEVICE — 1 ML TUBERCULIN SYRINGE REGULAR TIP: Brand: MONOJECT

## (undated) DEVICE — SOL  .9 1000ML BTL

## (undated) DEVICE — HEMOCLIP HORIZON SM MULTI

## (undated) DEVICE — TRAY FOLEY 16F IC URINMETER

## (undated) DEVICE — CV PACK-LF: Brand: MEDLINE INDUSTRIES, INC.

## (undated) DEVICE — GOWN SURG AERO CHROME XXL

## (undated) DEVICE — DECANTER BAG 9": Brand: MEDLINE INDUSTRIES, INC.

## (undated) DEVICE — BASIC DOUBLE BASIN 1-LF: Brand: MEDLINE INDUSTRIES, INC.

## (undated) DEVICE — GLOVE BIOGEL M SURG SZ 8

## (undated) DEVICE — HEMOCLIP MED 24 CLIP/CARTRIDGE

## (undated) DEVICE — GLOVE SURG TRIUMPH SZ 7

## (undated) DEVICE — SOL  .9 500ML

## (undated) DEVICE — GLOVE SURG TRIUMPH SZ 8

## (undated) DEVICE — 3M™ IOBAN™ 2 ANTIMICROBIAL INCISE DRAPE 6651EZ: Brand: IOBAN™ 2

## (undated) DEVICE — CHLORAPREP ORANGE TINT 10.5ML

## (undated) DEVICE — GLOVE SURG TRIUMPH SZ 71/2

## (undated) DEVICE — TRANSPOSAL ULTRAFLEX DUO/QUAD ULTRA CART MANIFOLD

## (undated) NOTE — LETTER
[address] FOR BLOOD OR BLOOD COMPONENTS      In the course of your treatment, it may become necessary to administer a transfusion of blood or blood components.  This form provides basic information concerning this proc explain the alternatives to you if it has not already been done. I,Corey Farfan, have read/had read to me the above. I understand the matters bearing on the decision whether or not to authorize a transfusion of blood or blood components.  I have n

## (undated) NOTE — MR AVS SNAPSHOT
[address] [address] [phone]               Thank you for choosing us for your health care visit with LEON Flanagan.   We are glad to serve you and happy to provide you wi Today's Vital Signs     BP Pulse Temp Height Weight BMI    126/74 mmHg 73 98.7 °F (37.1 °C) (Oral) 67\" 198 lb 31.00 kg/m2         Current Medications          This list is accurate as of: 3/22/17  3:36 PM.  Always use your most recent med list. including white bread, rice and pasta   Eat plenty of protein, keep the fat content low Sugars:  sodas and sports drinks, candies and desserts   Eat plenty of low-fat dairy products High fat meats and dairy   Choose whole grain products Foods high in sodiu

## (undated) NOTE — LETTER
[facility] [address], [address]  Consent for Procedure/Sedation    Date: 09/06/2018    Time: 08:45      1.  I authorize the performance upon Everypost the following: Placement of new Impella catheter and removal of ex Signature of person authorized to consent for patient:        Relationship to patient:    ________________________________    ___________________    Witness: _________________________      Date: ___________________    Printed: 9/6/2018   8:41 AM  Patient N